# Patient Record
Sex: MALE | Race: BLACK OR AFRICAN AMERICAN | NOT HISPANIC OR LATINO | Employment: FULL TIME | URBAN - METROPOLITAN AREA
[De-identification: names, ages, dates, MRNs, and addresses within clinical notes are randomized per-mention and may not be internally consistent; named-entity substitution may affect disease eponyms.]

---

## 2019-10-18 ENCOUNTER — HISTORICAL (OUTPATIENT)
Dept: LAB | Facility: HOSPITAL | Age: 29
End: 2019-10-18

## 2019-10-19 ENCOUNTER — HISTORICAL (OUTPATIENT)
Dept: LAB | Facility: HOSPITAL | Age: 29
End: 2019-10-19

## 2019-10-19 LAB
ABS NEUT (OLG): 4.12 X10(3)/MCL (ref 2.1–9.2)
ALBUMIN SERPL-MCNC: 3.7 GM/DL (ref 3.4–5)
ALBUMIN SERPL-MCNC: 3.7 GM/DL (ref 3.4–5)
ALBUMIN/GLOB SERPL: 0.9 RATIO (ref 1.1–2)
ALP SERPL-CCNC: 62 UNIT/L (ref 45–117)
ALT SERPL-CCNC: 49 UNIT/L (ref 12–78)
AST SERPL-CCNC: 20 UNIT/L (ref 15–37)
BASOPHILS # BLD AUTO: 0 X10(3)/MCL (ref 0–0.2)
BASOPHILS NFR BLD AUTO: 0 %
BILIRUB SERPL-MCNC: 0.3 MG/DL (ref 0.2–1)
BILIRUBIN DIRECT+TOT PNL SERPL-MCNC: 0.1 MG/DL (ref 0–0.2)
BILIRUBIN DIRECT+TOT PNL SERPL-MCNC: 0.2 MG/DL
BUN SERPL-MCNC: 9 MG/DL (ref 7–18)
BUN SERPL-MCNC: 9 MG/DL (ref 7–18)
CALCIUM SERPL-MCNC: 8.5 MG/DL (ref 8.5–10.1)
CALCIUM SERPL-MCNC: 8.5 MG/DL (ref 8.5–10.1)
CHLORIDE SERPL-SCNC: 104 MMOL/L (ref 98–107)
CHLORIDE SERPL-SCNC: 104 MMOL/L (ref 98–107)
CHOLEST SERPL-MCNC: 170 MG/DL
CHOLEST/HDLC SERPL: 5.3 {RATIO} (ref 0–5)
CO2 SERPL-SCNC: 28 MMOL/L (ref 21–32)
CO2 SERPL-SCNC: 28 MMOL/L (ref 21–32)
CREAT SERPL-MCNC: 0.9 MG/DL (ref 0.6–1.3)
CREAT SERPL-MCNC: 0.9 MG/DL (ref 0.6–1.3)
CREAT UR-MCNC: 142 MG/DL
EOSINOPHIL # BLD AUTO: 0.1 X10(3)/MCL (ref 0–0.9)
EOSINOPHIL NFR BLD AUTO: 2 %
ERYTHROCYTE [DISTWIDTH] IN BLOOD BY AUTOMATED COUNT: 14.7 % (ref 11.5–14.5)
EST. AVERAGE GLUCOSE BLD GHB EST-MCNC: 321 MG/DL
GLOBULIN SER-MCNC: 4.2 GM/ML (ref 2.3–3.5)
GLUCOSE SERPL-MCNC: 302 MG/DL (ref 74–106)
GLUCOSE SERPL-MCNC: 302 MG/DL (ref 74–106)
HBA1C MFR BLD: 12.8 % (ref 4.2–6.3)
HCT VFR BLD AUTO: 38.7 % (ref 40–51)
HDLC SERPL-MCNC: 32 MG/DL (ref 40–59)
HGB BLD-MCNC: 11.9 GM/DL (ref 13.5–17.5)
IMM GRANULOCYTES # BLD AUTO: 0.03 10*3/UL
IMM GRANULOCYTES NFR BLD AUTO: 0 %
LDLC SERPL CALC-MCNC: 107 MG/DL
LYMPHOCYTES # BLD AUTO: 4.1 X10(3)/MCL (ref 0.6–4.6)
LYMPHOCYTES NFR BLD AUTO: 45 %
MCH RBC QN AUTO: 21.6 PG (ref 26–34)
MCHC RBC AUTO-ENTMCNC: 30.7 GM/DL (ref 31–37)
MCV RBC AUTO: 70.2 FL (ref 80–100)
MICROALBUMIN UR-MCNC: 10.2 MG/L (ref 0–19)
MICROALBUMIN/CREAT RATIO PNL UR: 7.2 MCG/MG CR (ref 0–29)
MONOCYTES # BLD AUTO: 0.6 X10(3)/MCL (ref 0.1–1.3)
MONOCYTES NFR BLD AUTO: 6 %
NEUTROPHILS # BLD AUTO: 4.12 X10(3)/MCL (ref 2.1–9.2)
NEUTROPHILS NFR BLD AUTO: 46 %
PHOSPHATE SERPL-MCNC: 3.1 MG/DL (ref 2.5–4.9)
PLATELET # BLD AUTO: 282 X10(3)/MCL (ref 130–400)
PMV BLD AUTO: 12.3 FL (ref 7.4–10.4)
POTASSIUM SERPL-SCNC: 3.7 MMOL/L (ref 3.5–5.1)
POTASSIUM SERPL-SCNC: 3.7 MMOL/L (ref 3.5–5.1)
PROT SERPL-MCNC: 7.9 GM/DL (ref 6.4–8.2)
RBC # BLD AUTO: 5.51 X10(6)/MCL (ref 4.5–5.9)
SODIUM SERPL-SCNC: 139 MMOL/L (ref 136–145)
SODIUM SERPL-SCNC: 139 MMOL/L (ref 136–145)
T4 FREE SERPL-MCNC: 1.13 NG/DL (ref 0.76–1.46)
TRIGL SERPL-MCNC: 155 MG/DL
TSH SERPL-ACNC: 4.13 MIU/L (ref 0.36–3.74)
VLDLC SERPL CALC-MCNC: 31 MG/DL
WBC # SPEC AUTO: 9 X10(3)/MCL (ref 4.5–11)

## 2022-04-10 ENCOUNTER — HISTORICAL (OUTPATIENT)
Dept: ADMINISTRATIVE | Facility: HOSPITAL | Age: 32
End: 2022-04-10

## 2022-04-14 LAB
LEFT EYE DM RETINOPATHY: NEGATIVE
RIGHT EYE DM RETINOPATHY: NEGATIVE

## 2022-04-24 VITALS
DIASTOLIC BLOOD PRESSURE: 92 MMHG | OXYGEN SATURATION: 100 % | BODY MASS INDEX: 33.2 KG/M2 | WEIGHT: 267 LBS | SYSTOLIC BLOOD PRESSURE: 136 MMHG | HEIGHT: 75 IN

## 2022-05-10 ENCOUNTER — PATIENT MESSAGE (OUTPATIENT)
Dept: INTERNAL MEDICINE | Facility: CLINIC | Age: 32
End: 2022-05-10
Payer: COMMERCIAL

## 2022-05-13 ENCOUNTER — OFFICE VISIT (OUTPATIENT)
Dept: INTERNAL MEDICINE | Facility: CLINIC | Age: 32
End: 2022-05-13
Payer: COMMERCIAL

## 2022-05-13 DIAGNOSIS — Z11.59 NEED FOR HEPATITIS C SCREENING TEST: ICD-10-CM

## 2022-05-13 DIAGNOSIS — R00.2 HEART PALPITATIONS: ICD-10-CM

## 2022-05-13 DIAGNOSIS — E11.9 TYPE 2 DIABETES MELLITUS WITHOUT COMPLICATION, WITH LONG-TERM CURRENT USE OF INSULIN: Primary | ICD-10-CM

## 2022-05-13 DIAGNOSIS — R00.0 TACHYCARDIA: ICD-10-CM

## 2022-05-13 DIAGNOSIS — Z79.4 TYPE 2 DIABETES MELLITUS WITHOUT COMPLICATION, WITH LONG-TERM CURRENT USE OF INSULIN: Primary | ICD-10-CM

## 2022-05-13 PROBLEM — I10 PRIMARY HYPERTENSION: Status: ACTIVE | Noted: 2022-05-13

## 2022-05-13 PROBLEM — E78.2 MIXED HYPERLIPIDEMIA: Status: ACTIVE | Noted: 2019-01-08

## 2022-05-13 PROCEDURE — 99441 PR PHYSICIAN TELEPHONE EVALUATION 5-10 MIN: CPT | Mod: 95,,, | Performed by: NURSE PRACTITIONER

## 2022-05-13 PROCEDURE — 99441 PR PHYSICIAN TELEPHONE EVALUATION 5-10 MIN: ICD-10-PCS | Mod: 95,,, | Performed by: NURSE PRACTITIONER

## 2022-05-13 RX ORDER — METFORMIN HYDROCHLORIDE 1000 MG/1
TABLET ORAL
COMMUNITY
Start: 2022-04-27 | End: 2022-08-05 | Stop reason: SDUPTHER

## 2022-05-13 RX ORDER — LISINOPRIL 20 MG/1
20 TABLET ORAL DAILY
COMMUNITY
Start: 2022-04-27 | End: 2022-11-10 | Stop reason: SDUPTHER

## 2022-05-13 RX ORDER — INSULIN LISPRO 100 [IU]/ML
INJECTION, SOLUTION INTRAVENOUS; SUBCUTANEOUS
COMMUNITY
Start: 2022-04-19 | End: 2022-05-16 | Stop reason: SDUPTHER

## 2022-05-13 RX ORDER — ROSUVASTATIN CALCIUM 20 MG/1
TABLET, COATED ORAL
COMMUNITY
Start: 2022-04-27 | End: 2022-11-10 | Stop reason: SDUPTHER

## 2022-05-13 RX ORDER — INSULIN GLARGINE 100 [IU]/ML
INJECTION, SOLUTION SUBCUTANEOUS
COMMUNITY
Start: 2022-04-20 | End: 2022-08-05 | Stop reason: SDUPTHER

## 2022-05-13 RX ORDER — SILDENAFIL CITRATE 100 MG/1
100 TABLET, FILM COATED ORAL DAILY
COMMUNITY
Start: 2022-04-27 | End: 2023-05-08 | Stop reason: SDUPTHER

## 2022-05-13 NOTE — ASSESSMENT & PLAN NOTE
Continue medications as prescribed.  Lantus 40 units at bedtime  Insulin Lispro 10 units BID with meals  Metformin 1000 mg BID   Follow ADA diet.  Avoid soda, simple sweets, and limit rice/pasta/bread/starches and consume brown options when possible.   Maintain healthy weight with BMI goal <30.   Perform aerobic exercise for 150 minutes per week (or 5 days a week for 30 minutes each day).   Examine feet daily.

## 2022-05-13 NOTE — PROGRESS NOTES
Established Patient - Audio Only Telehealth Visit     The patient location is: home    SHRADDHA Pollock   [facility]  [address] [facility] [address]      PATIENT NAME: Rodrigo Laguerre  : 1990  DATE: 22  MRN: [id number]      Billing Provider: SHRADDHA Pollock  Level of Service:   Patient PCP Information     Provider PCP Type    SHRADDHA Pollock General          Reason for Visit / Chief Complaint: Diabetes       History of Present Illness / Problem Focused Workflow     Rodrigo Laguerre presents to the clinic with Diabetes     32 yo male here today CBG review via telemed. T2DM, HTN, and HLD.    Osteoporosis Screening - 22 Vitamin D level 56.3  Diabetic Screening - 22 Fundal Exam . 22 Foot Exam  STI Screening -     22   Pt reports CBG's are in the 150's AM Fasting, reports compliance with meds, unsure if taking statin at bedtime but will start. Pt reports that he is agreeable to wellness labs today and telemed f/u next week. Denies any acute issues today. Discussed foot exam with patient and possibility of diabetic shoes, declines at this time, will use his Axix. Agreeable to Fit and Fundal exams.     22   Pt reports compliance with all meds, reports that he is aware that his diet plays a big part in his life, reports he has started meal prepping and is trying to be aware of his intake and has also started incorporating cardio into his routine. Pt reports his fasting CBG's are in the 70'80's and sometime a little lower, will decrease night time Lantus today, agreeable to 2 week telemed OV to review fasting CBG's with Lantus change. All labs reviewed and discussed, meds reviewed and refilled appropriately. Agreeable to 2 week f/u and 3 month f/u with labs both telemed.    22  Pt reports today his AM Fasting sugars have een about 142 consisently since decreasing Lantus to 40 units at bedtime. Pt denies any  concerns or issues with the change, reports compliance with Metformin increase, is aware of 3 month f/u with fasting labs. Reports today with increased heart rate, reports he has a watch that shows at rest his heart rate is in the 90's and at times can be in the 100 teens, reports sometimes he feels it racing, is agreeable to 24 our Holter monitor for eval. Denies any other concerns today.       Review of Systems     Review of Systems   Constitutional: Negative.    HENT: Negative.    Eyes: Negative.    Respiratory: Negative.    Cardiovascular: Negative.    Gastrointestinal: Negative.    Endocrine: Negative.    Genitourinary: Negative.    Neurological: Negative.    Psychiatric/Behavioral: Negative.        Medical / Social / Family History   History reviewed. No pertinent past medical history.    History reviewed. No pertinent surgical history.    Social History    reports that he has never smoked. He has never used smokeless tobacco. He reports that he does not drink alcohol and does not use drugs.    Family History  's family history includes Diabetes in his mother.    Medications and Allergies     Medications  Outpatient Medications Marked as Taking for the 5/13/22 encounter (Office Visit) with SHRADDHA Pollock   Medication Sig Dispense Refill    insulin lispro 100 unit/mL injection ADMINISTER 10 UNITS UNDER THE SKIN TWICE DAILY WITH A MEAL      LANTUS SOLOSTAR U-100 INSULIN glargine 100 units/mL (3mL) SubQ pen INJECT 50 UNITS UNDER THE SKIN ONCE DAILY AT BEDTIME      lisinopriL (PRINIVIL,ZESTRIL) 20 MG tablet Take 20 mg by mouth once daily.      metFORMIN (GLUCOPHAGE) 1000 MG tablet TAKE 1 TABLET BY MOUTH TWICE DAILY WITH MEALS FOR DIABETES      rosuvastatin (CRESTOR) 20 MG tablet TAKE 1 TABLET BY MOUTH EVERY EVENING FOR HIGH CHOLESTEROL      VIAGRA 100 mg tablet Take 100 mg by mouth once daily.         Allergies  Review of patient's allergies indicates:  No Known Allergies    Physical Examination    There were no vitals filed for this visit.  Physical Exam  HENT:      Right Ear: Hearing normal.      Left Ear: Hearing normal.   Neurological:      Mental Status: He is alert and oriented to person, place, and time.   Psychiatric:         Mood and Affect: Mood normal.           Results         Assessment and Plan (including Health Maintenance)     Plan:         Health Maintenance Due   Topic Date Due    Hepatitis C Screening  Never done    HIV Screening  Never done    COVID-19 Vaccine (2 - Booster for Keshawn series) 05/21/2021       Problem List Items Addressed This Visit        Cardiac/Vascular    Tachycardia    Current Assessment & Plan     24 Hour Cardiac Monitor Ordered           Relevant Orders    Holter monitor - 24 hour    Heart palpitations    Current Assessment & Plan     24 Hour Cardiac Monitor Ordered           Relevant Orders    Holter monitor - 24 hour       Endocrine    Type 2 diabetes mellitus without complication, with long-term current use of insulin - Primary    Current Assessment & Plan     Continue medications as prescribed.  Lantus 40 units at bedtime  Insulin Lispro 10 units BID with meals  Metformin 1000 mg BID   Follow ADA diet.  Avoid soda, simple sweets, and limit rice/pasta/bread/starches and consume brown options when possible.   Maintain healthy weight with BMI goal <30.   Perform aerobic exercise for 150 minutes per week (or 5 days a week for 30 minutes each day).   Examine feet daily.              Relevant Medications    LANTUS SOLOSTAR U-100 INSULIN glargine 100 units/mL (3mL) SubQ pen    insulin lispro 100 unit/mL injection    metFORMIN (GLUCOPHAGE) 1000 MG tablet    Other Relevant Orders    Hemoglobin    Urinalysis, Reflex to Urine Culture Urine, Clean Catch    Microalbumin/Creatinine Ratio, Urine    Basic Metabolic Panel      Other Visit Diagnoses     Need for hepatitis C screening test        Relevant Orders    Hepatitis C Antibody          Health Maintenance Topics with  due status: Not Due       Topic Last Completion Date    TETANUS VACCINE 12/22/2019       Future Appointments   Date Time Provider Department Center   7/29/2022  7:15 AM SHRADDHA Pollock [facility]             Signature:  SHRADDHA Pollock  [facility]  [address] [facility] [address]    Date of encounter: 5/13/22  Visit type: Virtual visit with audio only (telephone)       The reason for the audio only service rather than synchronous audio and video virtual visit was related to technical difficulties or patient preference/necessity.     Each patient to whom I provide medical services by telemedicine is:  (1) informed of the relationship between the physician and patient and the respective role of any other health care provider with respect to management of the patient; and (2) notified that they may decline to receive medical services by telemedicine and may withdraw from such care at any time. Patient verbally consented to receive this service via voice-only telephone call.                             This service was not originating from a related E/M service provided within the previous 7 days nor will  to an E/M service or procedure within the next 24 hours or my soonest available appointment.  Prevailing standard of care was able to be met in this audio-only visit.

## 2022-05-16 ENCOUNTER — TELEPHONE (OUTPATIENT)
Dept: INTERNAL MEDICINE | Facility: CLINIC | Age: 32
End: 2022-05-16
Payer: COMMERCIAL

## 2022-05-16 RX ORDER — INSULIN LISPRO 100 [IU]/ML
10 INJECTION, SOLUTION INTRAVENOUS; SUBCUTANEOUS 2 TIMES DAILY WITH MEALS
Qty: 1 EACH | Refills: 1 | Status: SHIPPED | OUTPATIENT
Start: 2022-05-16 | End: 2022-05-17 | Stop reason: SDUPTHER

## 2022-05-16 RX ORDER — INSULIN LISPRO 100 [IU]/ML
10 INJECTION, SOLUTION INTRAVENOUS; SUBCUTANEOUS 2 TIMES DAILY WITH MEALS
Qty: 1 EACH | Refills: 1 | Status: SHIPPED | OUTPATIENT
Start: 2022-05-16 | End: 2022-05-16

## 2022-05-16 NOTE — TELEPHONE ENCOUNTER
Informed pt, rx was sent to the pharmacy.      Last ov:4/27/22  Next ov:  7/29/22    No shows: unknown

## 2022-05-17 DIAGNOSIS — Z79.4 TYPE 2 DIABETES MELLITUS WITHOUT COMPLICATION, WITH LONG-TERM CURRENT USE OF INSULIN: Primary | ICD-10-CM

## 2022-05-17 DIAGNOSIS — E11.9 TYPE 2 DIABETES MELLITUS WITHOUT COMPLICATION, WITH LONG-TERM CURRENT USE OF INSULIN: Primary | ICD-10-CM

## 2022-05-17 RX ORDER — INSULIN LISPRO 100 [IU]/ML
10 INJECTION, SOLUTION INTRAVENOUS; SUBCUTANEOUS 2 TIMES DAILY WITH MEALS
Qty: 1 EACH | Refills: 1 | Status: SHIPPED | OUTPATIENT
Start: 2022-05-17 | End: 2022-08-05

## 2022-05-30 ENCOUNTER — PATIENT MESSAGE (OUTPATIENT)
Dept: INTERNAL MEDICINE | Facility: CLINIC | Age: 32
End: 2022-05-30
Payer: COMMERCIAL

## 2022-06-01 ENCOUNTER — PATIENT MESSAGE (OUTPATIENT)
Dept: INTERNAL MEDICINE | Facility: CLINIC | Age: 32
End: 2022-06-01
Payer: COMMERCIAL

## 2022-08-02 ENCOUNTER — PATIENT MESSAGE (OUTPATIENT)
Dept: INTERNAL MEDICINE | Facility: CLINIC | Age: 32
End: 2022-08-02
Payer: COMMERCIAL

## 2022-08-02 ENCOUNTER — LAB VISIT (OUTPATIENT)
Dept: LAB | Facility: HOSPITAL | Age: 32
End: 2022-08-02
Attending: NURSE PRACTITIONER
Payer: COMMERCIAL

## 2022-08-02 DIAGNOSIS — Z11.59 NEED FOR HEPATITIS C SCREENING TEST: ICD-10-CM

## 2022-08-02 DIAGNOSIS — E11.9 TYPE 2 DIABETES MELLITUS WITHOUT COMPLICATION, WITH LONG-TERM CURRENT USE OF INSULIN: ICD-10-CM

## 2022-08-02 DIAGNOSIS — Z79.4 TYPE 2 DIABETES MELLITUS WITHOUT COMPLICATION, WITH LONG-TERM CURRENT USE OF INSULIN: ICD-10-CM

## 2022-08-02 DIAGNOSIS — E11.9 TYPE 2 DIABETES MELLITUS WITHOUT COMPLICATION, WITH LONG-TERM CURRENT USE OF INSULIN: Primary | ICD-10-CM

## 2022-08-02 DIAGNOSIS — Z79.4 TYPE 2 DIABETES MELLITUS WITHOUT COMPLICATION, WITH LONG-TERM CURRENT USE OF INSULIN: Primary | ICD-10-CM

## 2022-08-02 LAB
ANION GAP SERPL CALC-SCNC: 9 MEQ/L
APPEARANCE UR: CLEAR
BACTERIA #/AREA URNS AUTO: ABNORMAL /HPF
BILIRUB UR QL STRIP.AUTO: NEGATIVE MG/DL
BUN SERPL-MCNC: 8.9 MG/DL (ref 8.9–20.6)
CALCIUM SERPL-MCNC: 10.1 MG/DL (ref 8.4–10.2)
CHLORIDE SERPL-SCNC: 100 MMOL/L (ref 98–107)
CO2 SERPL-SCNC: 29 MMOL/L (ref 22–29)
COLOR UR AUTO: COLORLESS
CREAT SERPL-MCNC: 0.86 MG/DL (ref 0.73–1.18)
CREAT UR-MCNC: 50.1 MG/DL (ref 63–166)
CREAT/UREA NIT SERPL: 10
EST. AVERAGE GLUCOSE BLD GHB EST-MCNC: 228.8 MG/DL
GLUCOSE SERPL-MCNC: 110 MG/DL (ref 74–100)
GLUCOSE UR QL STRIP.AUTO: NORMAL MG/DL
HBA1C MFR BLD: 9.6 %
HCV AB SERPL QL IA: NONREACTIVE
HYALINE CASTS #/AREA URNS LPF: ABNORMAL /LPF
KETONES UR QL STRIP.AUTO: NEGATIVE MG/DL
LEUKOCYTE ESTERASE UR QL STRIP.AUTO: 75 UNIT/L
MICROALBUMIN UR-MCNC: 15.9 UG/ML
MICROALBUMIN/CREAT RATIO PNL UR: 31.7 MG/GM CR (ref 0–30)
NITRITE UR QL STRIP.AUTO: NEGATIVE
PH UR STRIP.AUTO: 7 [PH]
POTASSIUM SERPL-SCNC: 3.9 MMOL/L (ref 3.5–5.1)
PROT UR QL STRIP.AUTO: NEGATIVE MG/DL
RBC #/AREA URNS AUTO: ABNORMAL /HPF
RBC UR QL AUTO: NEGATIVE UNIT/L
SODIUM SERPL-SCNC: 138 MMOL/L (ref 136–145)
SP GR UR STRIP.AUTO: 1.01
SQUAMOUS #/AREA URNS LPF: ABNORMAL /HPF
UROBILINOGEN UR STRIP-ACNC: NORMAL MG/DL
WBC #/AREA URNS AUTO: ABNORMAL /HPF

## 2022-08-02 PROCEDURE — 86803 HEPATITIS C AB TEST: CPT

## 2022-08-02 PROCEDURE — 82043 UR ALBUMIN QUANTITATIVE: CPT

## 2022-08-02 PROCEDURE — 81001 URINALYSIS AUTO W/SCOPE: CPT

## 2022-08-02 PROCEDURE — 80048 BASIC METABOLIC PNL TOTAL CA: CPT

## 2022-08-02 PROCEDURE — 36415 COLL VENOUS BLD VENIPUNCTURE: CPT

## 2022-08-02 PROCEDURE — 83036 HEMOGLOBIN GLYCOSYLATED A1C: CPT

## 2022-08-05 ENCOUNTER — OFFICE VISIT (OUTPATIENT)
Dept: INTERNAL MEDICINE | Facility: CLINIC | Age: 32
End: 2022-08-05
Payer: COMMERCIAL

## 2022-08-05 DIAGNOSIS — Z79.4 TYPE 2 DIABETES MELLITUS WITHOUT COMPLICATION, WITH LONG-TERM CURRENT USE OF INSULIN: Primary | ICD-10-CM

## 2022-08-05 DIAGNOSIS — E11.9 TYPE 2 DIABETES MELLITUS WITHOUT COMPLICATION, WITH LONG-TERM CURRENT USE OF INSULIN: Primary | ICD-10-CM

## 2022-08-05 DIAGNOSIS — E78.2 MIXED HYPERLIPIDEMIA: ICD-10-CM

## 2022-08-05 PROCEDURE — 99442 PR PHYSICIAN TELEPHONE EVALUATION 11-20 MIN: ICD-10-PCS | Mod: 95,,, | Performed by: NURSE PRACTITIONER

## 2022-08-05 PROCEDURE — 99442 PR PHYSICIAN TELEPHONE EVALUATION 11-20 MIN: CPT | Mod: 95,,, | Performed by: NURSE PRACTITIONER

## 2022-08-05 RX ORDER — INSULIN GLARGINE 100 [IU]/ML
40 INJECTION, SOLUTION SUBCUTANEOUS NIGHTLY
Qty: 3 ML | Refills: 6 | Status: SHIPPED | OUTPATIENT
Start: 2022-08-05 | End: 2022-11-10 | Stop reason: SDUPTHER

## 2022-08-05 RX ORDER — INSULIN LISPRO 100 [IU]/ML
INJECTION, SOLUTION INTRAVENOUS; SUBCUTANEOUS
COMMUNITY
Start: 2022-05-18 | End: 2022-08-05 | Stop reason: SDUPTHER

## 2022-08-05 RX ORDER — METFORMIN HYDROCHLORIDE 1000 MG/1
1000 TABLET ORAL 2 TIMES DAILY WITH MEALS
Qty: 180 TABLET | Refills: 1 | Status: SHIPPED | OUTPATIENT
Start: 2022-08-05 | End: 2023-05-08 | Stop reason: SDUPTHER

## 2022-08-05 RX ORDER — INSULIN LISPRO 100 [IU]/ML
10 INJECTION, SOLUTION INTRAVENOUS; SUBCUTANEOUS 2 TIMES DAILY WITH MEALS
Qty: 18 ML | Refills: 1 | Status: SHIPPED | OUTPATIENT
Start: 2022-08-05 | End: 2022-10-22 | Stop reason: SDUPTHER

## 2022-08-05 NOTE — PROGRESS NOTES
SHRADDHA Pollock   [facility]  [address] [address]      PATIENT NAME: Rodrigo Laugerre  : 1990  DATE: 22  MRN: [id number]      Billing Provider: SHRADDHA Pollock  Level of Service: NC OFFICE/OUTPT VISIT, EST, LEVL IV, 30-39 MIN  Patient PCP Information     Provider PCP Type    SHRADDHA Pollock General          Reason for Visit / Chief Complaint: Follow-up (Lab review)       History of Present Illness / Problem Focused Workflow     Rodrigo Laguerre presents to the clinic with Follow-up (Lab review)     32 yo male here today T2DM f/u via audio virtual. T2DM, HTN, and HLD.    Osteoporosis Screening - 22 Vitamin D level 56.3  Diabetic Screening - 22 Fundal Exam . 22 Foot Exam  STI Screening -     22  Pt reports CBG's are in the 150's AM Fasting, reports compliance with meds, unsure if taking statin at bedtime but will start. Pt reports that he is agreeable to wellness labs today and telemed f/u next week. Denies any acute issues today. Discussed foot exam with patient and possibility of diabetic shoes, declines at this time, will use his Axix. Agreeable to Fit and Fundal exams.       22  Pt reports compliance with all meds, reports that he is aware that his diet plays a big part in his life, reports he has started meal prepping and is trying to be aware of his intake and has also started incorporating cardio into his routine. Pt reports his fasting CBG's are in the 70'80's and sometime a little lower, will decrease night time Lantus today, agreeable to 2 week telemed OV to review fasting CBG's with Lantus change. All labs reviewed and discussed, meds reviewed and refilled appropriately. Agreeable to 2 week f/u and 3 month f/u with labs both telemed.    22  Pt here today for lab review, previous A1C was 11 in April; today A1C is 9.6. Reports fasting CBG's in the AM have been running between 73 -  123. He reports he is still completing cardio / weight training 5 days a week and is still meal prepping, reports compliance with meds, reports he is feeling good. Pt is agreeable to adding Januvia 25 mg daily to his regimen. We will f/u in 3 month via audio virtual with fasting labs. Denies any other issues or conerns today.   Denies chest pain, shortness of breath, cough, fever, headache, dizziness, weakness, abdominal pain, nausea, vomiting, diarrhea, constipation, black/bloody stools, unplanned weight loss, night sweats, changes in urinary patterns, burning/odor with urination, depression, anxiety, and SI/HI.           Review of Systems     Review of Systems   Constitutional: Negative.    HENT: Negative.    Eyes: Negative.    Respiratory: Negative.    Cardiovascular: Negative.    Gastrointestinal: Negative.    Endocrine: Negative.    Genitourinary: Negative.    Neurological: Negative.    Psychiatric/Behavioral: Negative.        Medical / Social / Family History     Past Medical History:   Diagnosis Date    DM (diabetes mellitus), type 2     HTN (hypertension)        History reviewed. No pertinent surgical history.    Social History    reports that he has never smoked. He has never used smokeless tobacco. He reports that he does not drink alcohol and does not use drugs.    Family History  's family history includes Diabetes in his mother.    Medications and Allergies     Medications  Medication List with Changes/Refills   New Medications    SITAGLIPTIN (JANUVIA) 25 MG TAB    Take 1 tablet (25 mg total) by mouth once daily. For Diabetes.   Current Medications    LISINOPRIL (PRINIVIL,ZESTRIL) 20 MG TABLET    Take 20 mg by mouth once daily.    ROSUVASTATIN (CRESTOR) 20 MG TABLET    TAKE 1 TABLET BY MOUTH EVERY EVENING FOR HIGH CHOLESTEROL    VIAGRA 100 MG TABLET    Take 100 mg by mouth once daily.   Changed and/or Refilled Medications    Modified Medication Previous Medication    HUMALOG KWIKPEN INSULIN 100  UNIT/ML PEN HUMALOG KWIKPEN INSULIN 100 unit/mL pen       Inject 10 Units into the skin 2 (two) times daily with meals.    SMARTSIG:10 Unit(s) SUB-Q Twice Daily    LANTUS SOLOSTAR U-100 INSULIN GLARGINE 100 UNITS/ML SUBQ PEN LANTUS SOLOSTAR U-100 INSULIN glargine 100 units/mL (3mL) SubQ pen       Inject 40 Units into the skin every evening. For Diabetes.    INJECT 50 UNITS UNDER THE SKIN ONCE DAILY AT BEDTIME    METFORMIN (GLUCOPHAGE) 1000 MG TABLET metFORMIN (GLUCOPHAGE) 1000 MG tablet       Take 1 tablet (1,000 mg total) by mouth 2 (two) times daily with meals. For Diabetes    TAKE 1 TABLET BY MOUTH TWICE DAILY WITH MEALS FOR DIABETES   Discontinued Medications    INSULIN LISPRO 100 UNIT/ML CRTG    Inject 0.1 mLs (10 Units total) into the skin 2 (two) times a day.    INSULIN LISPRO 100 UNIT/ML INJECTION    Inject 10 Units into the skin 2 (two) times daily with meals.         Allergies  Review of patient's allergies indicates:  No Known Allergies    Physical Examination   There were no vitals filed for this visit.  Physical Exam  HENT:      Right Ear: Hearing normal.      Left Ear: Hearing normal.   Neurological:      Mental Status: He is alert and oriented to person, place, and time.   Psychiatric:         Mood and Affect: Mood normal.           Results     Lab Results   Component Value Date    WBC 9.0 10/19/2019    RBC 5.51 10/19/2019    HGB 11.9 (L) 10/19/2019    HCT 38.7 (L) 10/19/2019    MCV 70.2 (L) 10/19/2019    MCH 21.6 (L) 10/19/2019    MCHC 30.7 (L) 10/19/2019    RDW 14.7 (H) 10/19/2019     10/19/2019    MPV 12.3 (H) 10/19/2019     Sodium Level   Date Value Ref Range Status   08/02/2022 138 136 - 145 mmol/L Final     Potassium Level   Date Value Ref Range Status   08/02/2022 3.9 3.5 - 5.1 mmol/L Final     Carbon Dioxide   Date Value Ref Range Status   08/02/2022 29 22 - 29 mmol/L Final     Blood Urea Nitrogen   Date Value Ref Range Status   08/02/2022 8.9 8.9 - 20.6 mg/dL Final     Creatinine    Date Value Ref Range Status   08/02/2022 0.86 0.73 - 1.18 mg/dL Final     Calcium Level Total   Date Value Ref Range Status   08/02/2022 10.1 8.4 - 10.2 mg/dL Final     Albumin Level   Date Value Ref Range Status   10/19/2019 3.7 3.4 - 5.0 gm/dL Final   10/19/2019 3.7 3.4 - 5.0 gm/dL Final     Bilirubin Total   Date Value Ref Range Status   10/19/2019 0.3 0.2 - 1.0 mg/dL Final     Alkaline Phosphatase   Date Value Ref Range Status   10/19/2019 62 45 - 117 unit/L Final     Aspartate Aminotransferase   Date Value Ref Range Status   10/19/2019 20 15 - 37 unit/L Final     Alanine Aminotransferase   Date Value Ref Range Status   10/19/2019 49 12 - 78 unit/L Final     Estimated GFR-Non    Date Value Ref Range Status   10/19/2019 >105 >>=90 mL/min Final     Lab Results   Component Value Date    CHOL 170 10/19/2019     Lab Results   Component Value Date    HDL 32 (L) 10/19/2019     No results found for: LDLCALC  Lab Results   Component Value Date    TRIG 155 (H) 10/19/2019     No results found for: CHOLHDL  Lab Results   Component Value Date    TSH 4.130 (H) 10/19/2019     Lab Results   Component Value Date    PROTEINUA Negative 08/02/2022    LEUKOCYTESUR 75  08/02/2022     Lab Results   Component Value Date    HGBA1C 9.6 (H) 08/02/2022    HGBA1C 12.8 (H) 10/19/2019     No results found for: MICROALBUR, [id number]   No results found for this or any previous visit.         Assessment and Plan (including Health Maintenance)     Plan:         Health Maintenance Due   Topic Date Due    HIV Screening  Never done    COVID-19 Vaccine (2 - Booster for Keshawn series) 05/21/2021       Problem List Items Addressed This Visit        Cardiac/Vascular    Mixed hyperlipidemia       Endocrine    Type 2 diabetes mellitus without complication, with long-term current use of insulin - Primary (Chronic)    Current Assessment & Plan     RX Metformin 1000 mg BID, Lantus 40 units qhs, Humalog 10 units BID  START Januvia 25 mg  daily  CBG Logs  Continue diet and exercise regimen   3 month f/u with fasting labs audio   Follow ADA diet.  Avoid soda, simple sweets, and limit rice/pasta/bread/starches and consume brown options when possible.   Maintain healthy weight with BMI goal <30.   Perform aerobic exercise for 150 minutes per week (or 5 days a week for 30 minutes each day).   Examine feet daily.              Relevant Medications    SITagliptin (JANUVIA) 25 MG Tab    LANTUS SOLOSTAR U-100 INSULIN glargine 100 units/mL SubQ pen    metFORMIN (GLUCOPHAGE) 1000 MG tablet    HUMALOG KWIKPEN INSULIN 100 unit/mL pen    Other Relevant Orders    CBC Auto Differential    Comprehensive Metabolic Panel    Lipid Panel    Urinalysis, Reflex to Urine Culture Urine, Clean Catch    Microalbumin/Creatinine Ratio, Urine    Hemoglobin A1C          Health Maintenance Topics with due status: Not Due       Topic Last Completion Date    TETANUS VACCINE 12/22/2019    Influenza Vaccine 10/14/2021       Future Appointments   Date Time Provider Department Center   8/29/2022  9:30 AM CV [facility] CARDIAC MONITOR [facility] ADDISON [facility]             Signature:     [facility]  [address] [facility] [address]    Date of encounter: 8/5/22      Established Patient - Audio Only Telehealth Visit     The patient location is: home  The chief complaint leading to consultation is: T2DM  Visit type: Virtual visit with audio only (telephone)  Total time spent with patient: 13 mins       The reason for the audio only service rather than synchronous audio and video virtual visit was related to technical difficulties or patient preference/necessity.     Each patient to whom I provide medical services by telemedicine is:  (1) informed of the relationship between the physician and patient and the respective role of any other health care provider with respect to management of the patient; and (2) notified that they may  decline to receive medical services by telemedicine and may withdraw from such care at any time. Patient verbally consented to receive this service via voice-only telephone call.           This service was not originating from a related E/M service provided within the previous 7 days nor will  to an E/M service or procedure within the next 24 hours or my soonest available appointment.  Prevailing standard of care was able to be met in this audio-only visit.

## 2022-08-05 NOTE — ASSESSMENT & PLAN NOTE
RX Metformin 1000 mg BID, Lantus 40 units qhs, Humalog 10 units BID  START Januvia 25 mg daily  CBG Logs  Continue diet and exercise regimen   3 month f/u with fasting labs audio   Follow ADA diet.  Avoid soda, simple sweets, and limit rice/pasta/bread/starches and consume brown options when possible.   Maintain healthy weight with BMI goal <30.   Perform aerobic exercise for 150 minutes per week (or 5 days a week for 30 minutes each day).   Examine feet daily.

## 2022-09-23 ENCOUNTER — DOCUMENTATION ONLY (OUTPATIENT)
Dept: ADMINISTRATIVE | Facility: HOSPITAL | Age: 32
End: 2022-09-23
Payer: COMMERCIAL

## 2022-11-08 ENCOUNTER — LAB VISIT (OUTPATIENT)
Dept: LAB | Facility: HOSPITAL | Age: 32
End: 2022-11-08
Attending: NURSE PRACTITIONER
Payer: COMMERCIAL

## 2022-11-08 DIAGNOSIS — Z79.4 TYPE 2 DIABETES MELLITUS WITHOUT COMPLICATION, WITH LONG-TERM CURRENT USE OF INSULIN: ICD-10-CM

## 2022-11-08 DIAGNOSIS — E11.9 TYPE 2 DIABETES MELLITUS WITHOUT COMPLICATION, WITH LONG-TERM CURRENT USE OF INSULIN: ICD-10-CM

## 2022-11-08 LAB
ALBUMIN SERPL-MCNC: 4.3 GM/DL (ref 3.5–5)
ALBUMIN/GLOB SERPL: 1.1 RATIO (ref 1.1–2)
ALP SERPL-CCNC: 45 UNIT/L (ref 40–150)
ALT SERPL-CCNC: 36 UNIT/L (ref 0–55)
APPEARANCE UR: CLEAR
AST SERPL-CCNC: 26 UNIT/L (ref 5–34)
BACTERIA #/AREA URNS AUTO: ABNORMAL /HPF
BASOPHILS # BLD AUTO: 0.04 X10(3)/MCL (ref 0–0.2)
BASOPHILS NFR BLD AUTO: 0.5 %
BILIRUB UR QL STRIP.AUTO: NEGATIVE MG/DL
BILIRUBIN DIRECT+TOT PNL SERPL-MCNC: 0.4 MG/DL
BUN SERPL-MCNC: 12.5 MG/DL (ref 8.9–20.6)
CALCIUM SERPL-MCNC: 10.2 MG/DL (ref 8.4–10.2)
CHLORIDE SERPL-SCNC: 102 MMOL/L (ref 98–107)
CHOLEST SERPL-MCNC: 142 MG/DL
CHOLEST/HDLC SERPL: 4 {RATIO} (ref 0–5)
CO2 SERPL-SCNC: 27 MMOL/L (ref 22–29)
COLOR UR AUTO: ABNORMAL
CREAT SERPL-MCNC: 1.11 MG/DL (ref 0.73–1.18)
CREAT UR-MCNC: 251.6 MG/DL (ref 63–166)
EOSINOPHIL # BLD AUTO: 0.1 X10(3)/MCL (ref 0–0.9)
EOSINOPHIL NFR BLD AUTO: 1.2 %
ERYTHROCYTE [DISTWIDTH] IN BLOOD BY AUTOMATED COUNT: 15.2 % (ref 11.5–17)
EST. AVERAGE GLUCOSE BLD GHB EST-MCNC: 208.7 MG/DL
GFR SERPLBLD CREATININE-BSD FMLA CKD-EPI: >60 MLS/MIN/1.73/M2
GLOBULIN SER-MCNC: 3.9 GM/DL (ref 2.4–3.5)
GLUCOSE SERPL-MCNC: 181 MG/DL (ref 74–100)
GLUCOSE UR QL STRIP.AUTO: ABNORMAL MG/DL
HBA1C MFR BLD: 8.9 %
HCT VFR BLD AUTO: 37.9 % (ref 42–52)
HDLC SERPL-MCNC: 32 MG/DL (ref 35–60)
HGB BLD-MCNC: 11.7 GM/DL (ref 14–18)
HYALINE CASTS #/AREA URNS LPF: ABNORMAL /LPF
IMM GRANULOCYTES # BLD AUTO: 0.02 X10(3)/MCL (ref 0–0.04)
IMM GRANULOCYTES NFR BLD AUTO: 0.2 %
KETONES UR QL STRIP.AUTO: NEGATIVE MG/DL
LDLC SERPL CALC-MCNC: 87 MG/DL (ref 50–140)
LEUKOCYTE ESTERASE UR QL STRIP.AUTO: 25 UNIT/L
LYMPHOCYTES # BLD AUTO: 3.64 X10(3)/MCL (ref 0.6–4.6)
LYMPHOCYTES NFR BLD AUTO: 44.4 %
MCH RBC QN AUTO: 21.4 PG (ref 27–31)
MCHC RBC AUTO-ENTMCNC: 30.9 MG/DL (ref 33–36)
MCV RBC AUTO: 69.2 FL (ref 80–94)
MICROALBUMIN UR-MCNC: 60.2 UG/ML
MICROALBUMIN/CREAT RATIO PNL UR: 23.9 MG/GM CR (ref 0–30)
MONOCYTES # BLD AUTO: 0.58 X10(3)/MCL (ref 0.1–1.3)
MONOCYTES NFR BLD AUTO: 7.1 %
MUCOUS THREADS URNS QL MICRO: ABNORMAL /LPF
NEUTROPHILS # BLD AUTO: 3.8 X10(3)/MCL (ref 2.1–9.2)
NEUTROPHILS NFR BLD AUTO: 46.6 %
NITRITE UR QL STRIP.AUTO: NEGATIVE
NRBC BLD AUTO-RTO: 0 %
PH UR STRIP.AUTO: 5.5 [PH]
PLATELET # BLD AUTO: 283 X10(3)/MCL (ref 130–400)
PMV BLD AUTO: 12.1 FL (ref 7.4–10.4)
POTASSIUM SERPL-SCNC: 4.1 MMOL/L (ref 3.5–5.1)
PROT SERPL-MCNC: 8.2 GM/DL (ref 6.4–8.3)
PROT UR QL STRIP.AUTO: ABNORMAL MG/DL
RBC # BLD AUTO: 5.48 X10(6)/MCL (ref 4.7–6.1)
RBC #/AREA URNS AUTO: ABNORMAL /HPF
RBC UR QL AUTO: NEGATIVE UNIT/L
SODIUM SERPL-SCNC: 139 MMOL/L (ref 136–145)
SP GR UR STRIP.AUTO: 1.03
SQUAMOUS #/AREA URNS LPF: ABNORMAL /HPF
TRIGL SERPL-MCNC: 114 MG/DL (ref 34–140)
UROBILINOGEN UR STRIP-ACNC: NORMAL MG/DL
VLDLC SERPL CALC-MCNC: 23 MG/DL
WBC # SPEC AUTO: 8.2 X10(3)/MCL (ref 4.5–11.5)
WBC #/AREA URNS AUTO: ABNORMAL /HPF

## 2022-11-08 PROCEDURE — 87184 SC STD DISK METHOD PER PLATE: CPT

## 2022-11-08 PROCEDURE — 36415 COLL VENOUS BLD VENIPUNCTURE: CPT

## 2022-11-08 PROCEDURE — 82043 UR ALBUMIN QUANTITATIVE: CPT

## 2022-11-08 PROCEDURE — 83036 HEMOGLOBIN GLYCOSYLATED A1C: CPT

## 2022-11-08 PROCEDURE — 81001 URINALYSIS AUTO W/SCOPE: CPT

## 2022-11-08 PROCEDURE — 80053 COMPREHEN METABOLIC PANEL: CPT

## 2022-11-08 PROCEDURE — 80061 LIPID PANEL: CPT

## 2022-11-08 PROCEDURE — 85025 COMPLETE CBC W/AUTO DIFF WBC: CPT

## 2022-11-10 ENCOUNTER — OFFICE VISIT (OUTPATIENT)
Dept: INTERNAL MEDICINE | Facility: CLINIC | Age: 32
End: 2022-11-10
Payer: COMMERCIAL

## 2022-11-10 DIAGNOSIS — E11.9 TYPE 2 DIABETES MELLITUS WITHOUT COMPLICATION, WITH LONG-TERM CURRENT USE OF INSULIN: Primary | Chronic | ICD-10-CM

## 2022-11-10 DIAGNOSIS — Z79.4 TYPE 2 DIABETES MELLITUS WITHOUT COMPLICATION, WITH LONG-TERM CURRENT USE OF INSULIN: Primary | Chronic | ICD-10-CM

## 2022-11-10 DIAGNOSIS — I10 PRIMARY HYPERTENSION: ICD-10-CM

## 2022-11-10 DIAGNOSIS — E78.2 MIXED HYPERLIPIDEMIA: ICD-10-CM

## 2022-11-10 PROCEDURE — 99441 PR PHYSICIAN TELEPHONE EVALUATION 5-10 MIN: ICD-10-PCS | Mod: 95,,, | Performed by: NURSE PRACTITIONER

## 2022-11-10 PROCEDURE — 99441 PR PHYSICIAN TELEPHONE EVALUATION 5-10 MIN: CPT | Mod: 95,,, | Performed by: NURSE PRACTITIONER

## 2022-11-10 RX ORDER — LISINOPRIL 20 MG/1
20 TABLET ORAL DAILY
Qty: 90 TABLET | Refills: 1 | Status: SHIPPED | OUTPATIENT
Start: 2022-11-10 | End: 2023-05-08

## 2022-11-10 RX ORDER — INSULIN LISPRO 100 [IU]/ML
10 INJECTION, SOLUTION INTRAVENOUS; SUBCUTANEOUS 2 TIMES DAILY WITH MEALS
Qty: 18 ML | Refills: 2 | Status: SHIPPED | OUTPATIENT
Start: 2022-11-10 | End: 2023-05-08 | Stop reason: SDUPTHER

## 2022-11-10 RX ORDER — INSULIN GLARGINE 100 [IU]/ML
40 INJECTION, SOLUTION SUBCUTANEOUS NIGHTLY
Qty: 3 ML | Refills: 6 | Status: SHIPPED | OUTPATIENT
Start: 2022-11-10 | End: 2023-05-08 | Stop reason: SDUPTHER

## 2022-11-10 RX ORDER — ROSUVASTATIN CALCIUM 20 MG/1
20 TABLET, COATED ORAL NIGHTLY
Qty: 90 TABLET | Refills: 1 | Status: SHIPPED | OUTPATIENT
Start: 2022-11-10 | End: 2023-05-08 | Stop reason: SDUPTHER

## 2022-11-10 NOTE — PROGRESS NOTES
SHRADDHA Pollock   [facility]  [address] [facility] [address]      PATIENT NAME: Rodrigo Laguerre  : 1990  DATE: 11/10/22  MRN: [id number]      Reason for Visit / Chief Complaint: Follow-up (Lab review )       History of Present Illness / Problem Focused Workflow     Rodrigo Laguerre presents to the clinic with Follow-up (Lab review )     30 yo male here today T2DM f/u via audio virtual. T2DM, HTN, and HLD.    Osteoporosis Screening - 22 Vitamin D level 56.3  Diabetic Screening - 22 Fundal Exam . 22 Foot Exam  STI Screening -     22  Pt here today for lab review, previous A1C was 11 in April; today A1C is 9.6. Reports fasting CBG's in the AM have been running between 73 - 123. He reports he is still completing cardio / weight training 5 days a week and is still meal prepping, reports compliance with meds, reports he is feeling good. Pt is agreeable to adding Januvia 25 mg daily to his regimen. We will f/u in 3 month via audio virtual with fasting labs. Denies any other issues or conerns today.     11/10/22  Pt here today for routine audio f/u for T2DM, LOV we started Januvia 25 mg daily, pt A1C was 9.6 at that time, today is 8.9. The pt reports compliance with meds, he is still meal prepping and completing cardio / weight training. He is agreeable to increasing Januvia to 50 mg daily; will f/u in 3 months with labs to evaluate via audio. Denies any other concerns today. Meds reviewed and refilled appropriately.   Denies chest pain, shortness of breath, cough, fever, headache, dizziness, weakness, abdominal pain, nausea, vomiting, diarrhea, constipation, black/bloody stools, unplanned weight loss, night sweats, changes in urinary patterns, burning/odor with urination, depression, anxiety, and SI/HI.       Follow-up      Review of Systems     Review of Systems   Constitutional: Negative.    HENT: Negative.     Eyes:  Negative.    Respiratory: Negative.     Cardiovascular: Negative.    Gastrointestinal: Negative.    Endocrine: Negative.    Genitourinary: Negative.    Neurological: Negative.    Psychiatric/Behavioral: Negative.         Medications and Allergies     Medications  Medication List with Changes/Refills   Current Medications    HUMALOG KWIKPEN INSULIN 100 UNIT/ML PEN    Inject 10 Units into the skin 2 (two) times daily with meals.    LANTUS SOLOSTAR U-100 INSULIN GLARGINE 100 UNITS/ML SUBQ PEN    Inject 40 Units into the skin every evening. For Diabetes.    LISINOPRIL (PRINIVIL,ZESTRIL) 20 MG TABLET    Take 20 mg by mouth once daily.    METFORMIN (GLUCOPHAGE) 1000 MG TABLET    Take 1 tablet (1,000 mg total) by mouth 2 (two) times daily with meals. For Diabetes    ROSUVASTATIN (CRESTOR) 20 MG TABLET    TAKE 1 TABLET BY MOUTH EVERY EVENING FOR HIGH CHOLESTEROL    SITAGLIPTIN (JANUVIA) 25 MG TAB    Take 1 tablet (25 mg total) by mouth once daily. For Diabetes.    VIAGRA 100 MG TABLET    Take 100 mg by mouth once daily.         Allergies  Review of patient's allergies indicates:  No Known Allergies    Physical Examination   There were no vitals filed for this visit.  Physical Exam  HENT:      Right Ear: Hearing normal.      Left Ear: Hearing normal.   Neurological:      Mental Status: He is alert and oriented to person, place, and time.   Psychiatric:         Mood and Affect: Mood normal.         Results     Lab Results   Component Value Date    WBC 8.2 11/08/2022    RBC 5.48 11/08/2022    HGB 11.7 (L) 11/08/2022    HCT 37.9 (L) 11/08/2022    MCV 69.2 (L) 11/08/2022    MCH 21.4 (L) 11/08/2022    MCHC 30.9 (L) 11/08/2022    RDW 15.2 11/08/2022     11/08/2022    MPV 12.1 (H) 11/08/2022     Sodium Level   Date Value Ref Range Status   11/08/2022 139 136 - 145 mmol/L Final     Potassium Level   Date Value Ref Range Status   11/08/2022 4.1 3.5 - 5.1 mmol/L Final     Carbon Dioxide   Date Value Ref Range Status    11/08/2022 27 22 - 29 mmol/L Final     Blood Urea Nitrogen   Date Value Ref Range Status   11/08/2022 12.5 8.9 - 20.6 mg/dL Final     Creatinine   Date Value Ref Range Status   11/08/2022 1.11 0.73 - 1.18 mg/dL Final     Calcium Level Total   Date Value Ref Range Status   11/08/2022 10.2 8.4 - 10.2 mg/dL Final     Albumin Level   Date Value Ref Range Status   11/08/2022 4.3 3.5 - 5.0 gm/dL Final     Bilirubin Total   Date Value Ref Range Status   11/08/2022 0.4 <=1.5 mg/dL Final     Alkaline Phosphatase   Date Value Ref Range Status   11/08/2022 45 40 - 150 unit/L Final     Aspartate Aminotransferase   Date Value Ref Range Status   11/08/2022 26 5 - 34 unit/L Final     Alanine Aminotransferase   Date Value Ref Range Status   11/08/2022 36 0 - 55 unit/L Final     Estimated GFR-Non    Date Value Ref Range Status   10/19/2019 >105 >>=90 mL/min Final     Lab Results   Component Value Date    CHOL 142 11/08/2022     Lab Results   Component Value Date    HDL 32 (L) 11/08/2022     No results found for: LDLCALC  Lab Results   Component Value Date    TRIG 114 11/08/2022     No results found for: CHOLHDL  Lab Results   Component Value Date    TSH 4.130 (H) 10/19/2019     Lab Results   Component Value Date    PROTEINUA Trace (A) 11/08/2022    LEUKOCYTESUR 25 (A) 11/08/2022     Lab Results   Component Value Date    HGBA1C 8.9 (H) 11/08/2022    HGBA1C 9.6 (H) 08/02/2022    HGBA1C 12.8 (H) 10/19/2019     No results found for: MICROALBUR, [id number]   No results found for this or any previous visit.         Assessment and Plan      Plan:       Problem List Items Addressed This Visit    None        No future appointments.         Signature:     [facility]  [address] [facility] [address]    Date of encounter: 11/10/22      Established Patient - Audio Only Telehealth Visit     The patient location is: home  The chief complaint leading to  consultation is: T2DM  Visit type: Virtual visit with audio only (telephone)  Total time spent with patient: 9 mins       The reason for the audio only service rather than synchronous audio and video virtual visit was related to technical difficulties or patient preference/necessity.     Each patient to whom I provide medical services by telemedicine is:  (1) informed of the relationship between the physician and patient and the respective role of any other health care provider with respect to management of the patient; and (2) notified that they may decline to receive medical services by telemedicine and may withdraw from such care at any time. Patient verbally consented to receive this service via voice-only telephone call       This service was not originating from a related E/M service provided within the previous 7 days nor will  to an E/M service or procedure within the next 24 hours or my soonest available appointment.  Prevailing standard of care was able to be met in this audio-only visit.

## 2022-11-10 NOTE — ASSESSMENT & PLAN NOTE
Rx lisinopril 50 mg daily   Low Sodium Diet (Dash Diet - less than 2 grams of sodium per day).  Maintain healthy weight with goal BMI <30. Exercise 30 minutes per day 5 days per week.

## 2022-11-10 NOTE — ASSESSMENT & PLAN NOTE
RX rosuvastatin 20 mg q hs  Follow a low cholesterol, low saturated fat diet with less than 200 mg of cholesterol a day.   Avoid fried foods and high saturated fats (nicole, sausage, cookies, cakes, chips, cheese, whole milk, butter, mayonnaise, creamy dressings, gravy, stew, gumbo, boudin, cracklins and cream sauces).  Add flax seed or fish oil supplements to diet.   Increase dietary fiber.   Regular exercise improves cholesterol levels.  Physical activity 5 times a week for 30 minutes per day (or 150 minutes per week).   Stressed importance of dietary modifications.

## 2022-11-10 NOTE — ASSESSMENT & PLAN NOTE
RX Metformin 1000 mg BID  RX Increase januvia to 50 mg daily  RX lantus 40 units q hs  RX humalog 10 units BIDAC  3 month f/u F2F with labs   Follow ADA diet.  Avoid soda, simple sweets, and limit rice/pasta/bread/starches and consume brown options when possible.   Maintain healthy weight with BMI goal <30.   Perform aerobic exercise for 150 minutes per week (or 5 days a week for 30 minutes each day).   Examine feet daily.

## 2022-11-12 LAB — BACTERIA UR CULT: ABNORMAL

## 2022-12-07 ENCOUNTER — CLINICAL SUPPORT (OUTPATIENT)
Dept: URGENT CARE | Facility: CLINIC | Age: 32
End: 2022-12-07
Payer: COMMERCIAL

## 2022-12-07 DIAGNOSIS — Z23 ENCOUNTER FOR VACCINATION: Primary | ICD-10-CM

## 2022-12-07 PROCEDURE — 99211 OFF/OP EST MAY X REQ PHY/QHP: CPT | Mod: PBBFAC,25

## 2022-12-07 PROCEDURE — 90471 IMMUNIZATION ADMIN: CPT | Mod: PBBFAC

## 2022-12-07 NOTE — PROGRESS NOTES
Subjective:       Patient ID: Rodrigo Laguerre is a 32 y.o. male.    Vitals:  vitals were not taken for this visit.     Chief Complaint: Flu Vaccine    HPI  ROS    Objective:      Physical Exam      Assessment:       1. Encounter for vaccination            Plan:         Encounter for vaccination  -     Influenza - Quadrivalent (PF); Standing

## 2023-01-05 ENCOUNTER — PATIENT MESSAGE (OUTPATIENT)
Dept: INTERNAL MEDICINE | Facility: CLINIC | Age: 33
End: 2023-01-05
Payer: COMMERCIAL

## 2023-01-05 DIAGNOSIS — R53.83 FATIGUE, UNSPECIFIED TYPE: Primary | ICD-10-CM

## 2023-01-16 ENCOUNTER — PATIENT MESSAGE (OUTPATIENT)
Dept: INTERNAL MEDICINE | Facility: CLINIC | Age: 33
End: 2023-01-16
Payer: COMMERCIAL

## 2023-01-18 ENCOUNTER — OFFICE VISIT (OUTPATIENT)
Dept: URGENT CARE | Facility: CLINIC | Age: 33
End: 2023-01-18
Payer: COMMERCIAL

## 2023-01-18 VITALS
RESPIRATION RATE: 18 BRPM | WEIGHT: 270 LBS | HEART RATE: 82 BPM | TEMPERATURE: 98 F | BODY MASS INDEX: 34.65 KG/M2 | HEIGHT: 74 IN | SYSTOLIC BLOOD PRESSURE: 121 MMHG | OXYGEN SATURATION: 99 % | DIASTOLIC BLOOD PRESSURE: 77 MMHG

## 2023-01-18 DIAGNOSIS — M25.511 ACUTE PAIN OF RIGHT SHOULDER: Primary | ICD-10-CM

## 2023-01-18 PROCEDURE — 99213 PR OFFICE/OUTPT VISIT, EST, LEVL III, 20-29 MIN: ICD-10-PCS | Mod: S$PBB,,,

## 2023-01-18 PROCEDURE — 99213 OFFICE O/P EST LOW 20 MIN: CPT | Mod: S$PBB,,,

## 2023-01-18 PROCEDURE — 99214 OFFICE O/P EST MOD 30 MIN: CPT | Mod: PBBFAC

## 2023-01-18 RX ORDER — KETOROLAC TROMETHAMINE 30 MG/ML
30 INJECTION, SOLUTION INTRAMUSCULAR; INTRAVENOUS
Status: COMPLETED | OUTPATIENT
Start: 2023-01-18 | End: 2023-01-18

## 2023-01-18 RX ORDER — NAPROXEN 500 MG/1
500 TABLET ORAL 2 TIMES DAILY WITH MEALS
Qty: 28 TABLET | Refills: 0 | Status: SHIPPED | OUTPATIENT
Start: 2023-01-18 | End: 2023-02-01

## 2023-01-18 RX ADMIN — KETOROLAC TROMETHAMINE 30 MG: 30 INJECTION, SOLUTION INTRAMUSCULAR; INTRAVENOUS at 02:01

## 2023-01-26 ENCOUNTER — OFFICE VISIT (OUTPATIENT)
Dept: URGENT CARE | Facility: CLINIC | Age: 33
End: 2023-01-26
Payer: COMMERCIAL

## 2023-01-26 ENCOUNTER — TELEPHONE (OUTPATIENT)
Dept: INTERNAL MEDICINE | Facility: CLINIC | Age: 33
End: 2023-01-26
Payer: COMMERCIAL

## 2023-01-26 VITALS
OXYGEN SATURATION: 97 % | DIASTOLIC BLOOD PRESSURE: 90 MMHG | BODY MASS INDEX: 33.62 KG/M2 | WEIGHT: 262 LBS | TEMPERATURE: 98 F | RESPIRATION RATE: 18 BRPM | SYSTOLIC BLOOD PRESSURE: 151 MMHG | HEIGHT: 74 IN | HEART RATE: 80 BPM

## 2023-01-26 DIAGNOSIS — E11.9 TYPE 2 DIABETES MELLITUS WITHOUT COMPLICATION, WITH LONG-TERM CURRENT USE OF INSULIN: ICD-10-CM

## 2023-01-26 DIAGNOSIS — Z79.4 TYPE 2 DIABETES MELLITUS WITHOUT COMPLICATION, WITH LONG-TERM CURRENT USE OF INSULIN: ICD-10-CM

## 2023-01-26 DIAGNOSIS — S90.822A BLISTER OF LEFT FOOT, INITIAL ENCOUNTER: Primary | ICD-10-CM

## 2023-01-26 PROCEDURE — 99213 PR OFFICE/OUTPT VISIT, EST, LEVL III, 20-29 MIN: ICD-10-PCS | Mod: S$PBB,,, | Performed by: FAMILY MEDICINE

## 2023-01-26 PROCEDURE — 99213 OFFICE O/P EST LOW 20 MIN: CPT | Mod: S$PBB,,, | Performed by: FAMILY MEDICINE

## 2023-01-26 RX ORDER — LEVOFLOXACIN 500 MG/1
500 TABLET, FILM COATED ORAL DAILY
Qty: 7 TABLET | Refills: 0 | Status: SHIPPED | OUTPATIENT
Start: 2023-01-26 | End: 2023-02-02

## 2023-01-26 RX ORDER — MUPIROCIN 20 MG/G
OINTMENT TOPICAL 3 TIMES DAILY
Qty: 22 G | Refills: 1 | Status: SHIPPED | OUTPATIENT
Start: 2023-01-26 | End: 2023-02-02

## 2023-01-26 NOTE — TELEPHONE ENCOUNTER
Patient called stating he had a blister on his foot, and it started to peel. Wants you to have a look at it and possibly schedule an appt if there's anything soon. Please advise.

## 2023-01-26 NOTE — PATIENT INSTRUCTIONS
Apply mupirocin to the wound twice daily    Apply generous Vaseline on the wound once or twice daily    Take Levaquin once daily for 7 days    Use a hydrocolloid dressing over the wound while it heals to prevent further friction from impairing healing.    Someone will call you with an appointment with endocrinology.    Please follow-up with your primary care provider in the next couple weeks.

## 2023-01-26 NOTE — PROGRESS NOTES
"Subjective:       Patient ID: Rodrigo Laguerre is a 32 y.o. male.    Vitals:  height is 6' 2" (1.88 m) and weight is 118.8 kg (262 lb). His temperature is 98.1 °F (36.7 °C). His blood pressure is 151/90 (abnormal) and his pulse is 80. His respiration is 18 and oxygen saturation is 97%.     Chief Complaint: Blister (Blister on left foot suspects from work boots x 1 week. Patient is a type 2 diabetic )    HPI  ROS    Objective:      Physical Exam      Assessment:       No diagnosis found.      Plan:         There are no diagnoses linked to this encounter.                 "

## 2023-01-26 NOTE — PROGRESS NOTES
Patient ID: [id number]     Chief Complaint: upper respiratory tract infection symptoms    History of Present Illness:     Rodrigo Laguerre is a 32 y.o. male  who presents today for symptoms of Blister (Blister on left foot suspects from work boots x 1 week. Patient is a type 2 diabetic )    32-year-old male with a history of type 2 diabetes presents today with an unroofed friction blister under his left big toe.  He is concerned about infection because he is diabetic, and he wanted some advice.      Pt denies experiencing any fevers, chills, nausea, vomiting, difficulty breathing, dysphagia, or neck stiffness.    Past Medical History:     ----------------------------  DM (diabetes mellitus), type 2  HTN (hypertension)     History reviewed. No pertinent surgical history.    Review of patient's allergies indicates:  No Known Allergies    Outpatient Medications Marked as Taking for the 1/26/23 encounter (Office Visit) with Neel Graves MD   Medication Sig Dispense Refill    HUMALOG KWIKPEN INSULIN 100 unit/mL pen Inject 10 Units into the skin 2 (two) times daily with meals. 18 mL 2    LANTUS SOLOSTAR U-100 INSULIN glargine 100 units/mL SubQ pen Inject 40 Units into the skin every evening. For Diabetes. 3 mL 6    metFORMIN (GLUCOPHAGE) 1000 MG tablet Take 1 tablet (1,000 mg total) by mouth 2 (two) times daily with meals. For Diabetes 180 tablet 1    naproxen (NAPROSYN) 500 MG tablet Take 1 tablet (500 mg total) by mouth 2 (two) times daily with meals. for 14 days 28 tablet 0    rosuvastatin (CRESTOR) 20 MG tablet Take 1 tablet (20 mg total) by mouth every evening. For High Cholesterol 90 tablet 1    SITagliptin phosphate (JANUVIA) 50 MG Tab Take 1 tablet (50 mg total) by mouth once daily. For Diabetes. 90 tablet 0       Social History     Socioeconomic History    Marital status: Single   Occupational History    Occupation:    Tobacco Use    Smoking status: Never    Smokeless tobacco: Never   Substance  "and Sexual Activity    Alcohol use: Never    Drug use: Never    Sexual activity: Yes     Partners: Female     Social Determinants of Health     Financial Resource Strain: Low Risk     Difficulty of Paying Living Expenses: Not hard at all   Food Insecurity: No Food Insecurity    Worried About Running Out of Food in the Last Year: Never true    Ran Out of Food in the Last Year: Never true   Transportation Needs: No Transportation Needs    Lack of Transportation (Medical): No    Lack of Transportation (Non-Medical): No   Stress: No Stress Concern Present    Feeling of Stress : Not at all   Social Connections: Socially Isolated    Frequency of Communication with Friends and Family: Once a week    Frequency of Social Gatherings with Friends and Family: Once a week    Attends Voodoo Services: More than 4 times per year    Active Member of Clubs or Organizations: No    Attends Club or Organization Meetings: Never    Marital Status: Never    Housing Stability: Unknown    Unable to Pay for Housing in the Last Year: No    Unstable Housing in the Last Year: No        Family History   Problem Relation Age of Onset    Diabetes Mother         Subjective:     Review of Systems   Constitutional:  Negative for chills, fever and malaise/fatigue.   Eyes:  Negative for pain and redness.   Respiratory:  Negative for shortness of breath.    Cardiovascular:  Negative for chest pain.   Gastrointestinal:  Negative for abdominal pain, diarrhea, nausea and vomiting.   Musculoskeletal:  Negative for joint pain and myalgias.   Skin:  Negative for itching and rash.        Blister on big toe   Neurological:  Negative for weakness.     Objective:     BP (!) 151/90 (BP Location: Right arm)   Pulse 80   Temp 98.1 °F (36.7 °C)   Resp 18   Ht 6' 2" (1.88 m)   Wt 118.8 kg (262 lb)   SpO2 97%   BMI 33.64 kg/m²     Physical Exam  Constitutional:       General: He is not in acute distress.     Appearance: Normal appearance. He is normal " weight. He is not ill-appearing or toxic-appearing.   HENT:      Head: Normocephalic and atraumatic.      Nose: No congestion or rhinorrhea.   Eyes:      General:         Right eye: No discharge.         Left eye: No discharge.      Extraocular Movements: Extraocular movements intact.      Conjunctiva/sclera: Conjunctivae normal.   Cardiovascular:      Rate and Rhythm: Normal rate.   Chest:      Chest wall: No tenderness.   Skin:     Coloration: Skin is not jaundiced or pale.      Findings: Lesion present. No bruising, erythema or rash.      Comments: 2 cm unroofed blister under left big toe.  No surrounding erythema.  Base is clean.  Minimal epithelialization yet   Neurological:      Mental Status: He is alert and oriented to person, place, and time. Mental status is at baseline.   Psychiatric:         Mood and Affect: Mood normal.         Behavior: Behavior normal.       Assessment & Plan:       ICD-10-CM ICD-9-CM   1. Blister of left foot, initial encounter  S90.822A 917.2   2. Type 2 diabetes mellitus without complication, with long-term current use of insulin  E11.9 250.00    Z79.4 V58.67        1. Blister of left foot, initial encounter  -     levoFLOXacin (LEVAQUIN) 500 MG tablet; Take 1 tablet (500 mg total) by mouth once daily. for 7 days  Dispense: 7 tablet; Refill: 0  -     mupirocin (BACTROBAN) 2 % ointment; Apply topically 3 (three) times daily. for 7 days  Dispense: 22 g; Refill: 1    2. Type 2 diabetes mellitus without complication, with long-term current use of insulin  -     Ambulatory referral/consult to Endocrinology         We will put him on prophylactic Levaquin against Pseudomonas infection which should allay his anxiety, and add topical mupirocin on it in addition.  Had a long discussion about proper wound care, he will use a hydrocolloid dressing and place Vaseline over the daily to minimize scabbing.    Incidentally he brought up that a healthcare provider asked him recently whether or not  he was seeing an endocrinologist for his diabetes, and he was wondering if he can get a referral for that.  We can accommodate him in that regard.  He has an appointment with his PCP in the next couple weeks, and he will talk with her about the proprietary of the referral

## 2023-01-26 NOTE — TELEPHONE ENCOUNTER
Please inform the patient that I will not be in clinic the rest of the week and have no availability until next Wednesday and with his dx of Diabetes I would advise he needs to go to an Urgent Care and have it evaluated today.    Thanks.

## 2023-02-03 ENCOUNTER — PATIENT MESSAGE (OUTPATIENT)
Dept: INTERNAL MEDICINE | Facility: CLINIC | Age: 33
End: 2023-02-03
Payer: COMMERCIAL

## 2023-02-27 ENCOUNTER — OFFICE VISIT (OUTPATIENT)
Dept: ORTHOPEDICS | Facility: CLINIC | Age: 33
End: 2023-02-27
Payer: COMMERCIAL

## 2023-02-27 ENCOUNTER — HOSPITAL ENCOUNTER (OUTPATIENT)
Dept: RADIOLOGY | Facility: CLINIC | Age: 33
Discharge: HOME | End: 2023-02-27
Attending: ORTHOPAEDIC SURGERY
Payer: COMMERCIAL

## 2023-02-27 VITALS
HEIGHT: 74 IN | WEIGHT: 262 LBS | SYSTOLIC BLOOD PRESSURE: 174 MMHG | HEART RATE: 76 BPM | DIASTOLIC BLOOD PRESSURE: 98 MMHG | BODY MASS INDEX: 33.62 KG/M2

## 2023-02-27 DIAGNOSIS — M25.511 ACUTE PAIN OF RIGHT SHOULDER: ICD-10-CM

## 2023-02-27 DIAGNOSIS — S46.811A TRAPEZIUS STRAIN, RIGHT, INITIAL ENCOUNTER: Primary | ICD-10-CM

## 2023-02-27 PROCEDURE — 99203 PR OFFICE/OUTPT VISIT, NEW, LEVL III, 30-44 MIN: ICD-10-PCS | Mod: ,,, | Performed by: NURSE PRACTITIONER

## 2023-02-27 PROCEDURE — 73030 XR SHOULDER COMPLETE 2 OR MORE VIEWS RIGHT: ICD-10-PCS | Mod: RT,,, | Performed by: ORTHOPAEDIC SURGERY

## 2023-02-27 PROCEDURE — 99203 OFFICE O/P NEW LOW 30 MIN: CPT | Mod: ,,, | Performed by: NURSE PRACTITIONER

## 2023-02-27 PROCEDURE — 73030 X-RAY EXAM OF SHOULDER: CPT | Mod: RT,,, | Performed by: ORTHOPAEDIC SURGERY

## 2023-02-27 RX ORDER — NAPROXEN 500 MG/1
500 TABLET ORAL 2 TIMES DAILY
Qty: 28 TABLET | Refills: 2 | Status: SHIPPED | OUTPATIENT
Start: 2023-02-27

## 2023-02-27 NOTE — PROGRESS NOTES
Chief Complaint:   Chief Complaint   Patient presents with    Right Shoulder - Pain     Off and on shoulder pain. Hurts near collar bone/neck area. Some days he hardly feels it and some days it's severe pain. Has good ROM. Feels like something is pulling down on his muscles.        Consulting Physician: No ref. provider found    History of present illness:    he  is a pleasant 32 y.o. year old male with right shoulder pain since January 2023. He denies injury. His pain is superiorly along the trapezius. It radiates down anteriorly across the chest. He denies numbness, tingling, or neck issues. He was recently placed on Naprosyn which helped, but his pain returned when he ran out of medication.     Past Medical History:   Diagnosis Date    DM (diabetes mellitus), type 2     HTN (hypertension)        History reviewed. No pertinent surgical history.    Current Outpatient Medications   Medication Sig    HUMALOG KWIKPEN INSULIN 100 unit/mL pen Inject 10 Units into the skin 2 (two) times daily with meals.    LANTUS SOLOSTAR U-100 INSULIN glargine 100 units/mL SubQ pen Inject 40 Units into the skin every evening. For Diabetes.    lisinopriL (PRINIVIL,ZESTRIL) 20 MG tablet Take 1 tablet (20 mg total) by mouth once daily. For High Blood Pressure (Patient not taking: Reported on 1/18/2023)    metFORMIN (GLUCOPHAGE) 1000 MG tablet Take 1 tablet (1,000 mg total) by mouth 2 (two) times daily with meals. For Diabetes    naproxen (NAPROSYN) 500 MG tablet Take 1 tablet (500 mg total) by mouth 2 (two) times daily.    rosuvastatin (CRESTOR) 20 MG tablet Take 1 tablet (20 mg total) by mouth every evening. For High Cholesterol    SITagliptin phosphate (JANUVIA) 50 MG Tab Take 1 tablet (50 mg total) by mouth once daily. For Diabetes.    VIAGRA 100 mg tablet Take 100 mg by mouth once daily.     No current facility-administered medications for this visit.       Review of patient's allergies indicates:  No Known Allergies    Family History    Problem Relation Age of Onset    Diabetes Mother        Social History     Socioeconomic History    Marital status: Single   Occupational History    Occupation:    Tobacco Use    Smoking status: Never    Smokeless tobacco: Never   Substance and Sexual Activity    Alcohol use: Never    Drug use: Never    Sexual activity: Yes     Partners: Female     Social Determinants of Health     Financial Resource Strain: Low Risk     Difficulty of Paying Living Expenses: Not hard at all   Food Insecurity: No Food Insecurity    Worried About Running Out of Food in the Last Year: Never true    Ran Out of Food in the Last Year: Never true   Transportation Needs: No Transportation Needs    Lack of Transportation (Medical): No    Lack of Transportation (Non-Medical): No   Stress: No Stress Concern Present    Feeling of Stress : Not at all   Social Connections: Socially Isolated    Frequency of Communication with Friends and Family: Once a week    Frequency of Social Gatherings with Friends and Family: Once a week    Attends Restorationism Services: More than 4 times per year    Active Member of Clubs or Organizations: No    Attends Club or Organization Meetings: Never    Marital Status: Never    Housing Stability: Unknown    Unable to Pay for Housing in the Last Year: No    Unstable Housing in the Last Year: No       Review of Systems:    Constitution:   Denies chills, fever, and sweats.  HENT:   Denies headaches or blurry vision.  Cardiovascular:  Denies chest pain or irregular heart beat.  Respiratory:   Denies cough or shortness of breath.  Gastrointestinal:  Denies abdominal pain, nausea, or vomiting.  Musculoskeletal:   Denies muscle cramps.  Neurological:   Denies dizziness or focal weakness.  Psychiatric/Behavior: Normal mental status.  Hematology/Lymph:  Denies bleeding problem or easy bruising/bleeding.  Skin:    Denies rash or suspicious lesions.    Examination:    Vital Signs:    Vitals:    02/27/23 1542  "  BP: (!) 174/98   Pulse: 76   Weight: 118.8 kg (262 lb)   Height: 6' 2" (1.88 m)   PainSc:   8       Body mass index is 33.64 kg/m².    Constitution:   Well-developed, well nourished patient in no acute distress.  Neurological:   Alert and oriented x 3 and cooperative to examination.     Psychiatric/Behavior: Normal mental status.  Respiratory:   No shortness of breath.  Eyes:    Extraoccular muscles intact  Skin:    No scars, rash or suspicious lesions.    MSK:   Shoulder Exam:                   Right        Left  Skin:                                   Normal     Normal  AC joint tenderness:           None         None  Forward Flexion:                180            180  Abduction:                          180           180  External Rotation:               80              80  Internal Rotation:                80             80  Supraspinatus stress test: Neg           Neg  Hawkin's Impingement:     Neg           Neg  Neer Impingement:            Neg           Neg  Apprehension:                   Neg           Neg  Gregory's:                           Neg           Neg  Speed's test:                     Neg            Neg  Strength:  External Rotation:           5/5                5/5  Lift Off/belly press:          5/5                5/5    N-V status:                   Intact             Intact    C-spine: Normal ROM, NT  Mild TTP over trapezius muscle       Imaging: X-rays ordered and images interpreted today personally by me of 4 views of the right shoulder show normal bony alignment       Assessment: Trapezius strain, right, initial encounter  -     X-ray Shoulder 2 or More Views Right; Future; Expected date: 02/27/2023    Other orders  -     naproxen (NAPROSYN) 500 MG tablet; Take 1 tablet (500 mg total) by mouth 2 (two) times daily.  Dispense: 28 tablet; Refill: 2        Plan:  Possible trapezius muscle strain. We will restart him on Naprosyn and refer him to formal therapy. He can follow up if he has any " issues.

## 2023-03-07 ENCOUNTER — TELEPHONE (OUTPATIENT)
Dept: ORTHOPEDICS | Facility: CLINIC | Age: 33
End: 2023-03-07
Payer: COMMERCIAL

## 2023-03-23 DIAGNOSIS — E11.9 TYPE 2 DIABETES MELLITUS WITHOUT COMPLICATION, WITH LONG-TERM CURRENT USE OF INSULIN: Chronic | ICD-10-CM

## 2023-03-23 DIAGNOSIS — Z79.4 TYPE 2 DIABETES MELLITUS WITHOUT COMPLICATION, WITH LONG-TERM CURRENT USE OF INSULIN: Chronic | ICD-10-CM

## 2023-03-23 DIAGNOSIS — R53.83 FATIGUE, UNSPECIFIED TYPE: Primary | ICD-10-CM

## 2023-03-23 NOTE — TELEPHONE ENCOUNTER
30 day supply approved.    The pt missed his LOV with me in February which was supposed to be a virtual OV. Pt needs a new f/u with me  - it needs to be F2F and he needs to complete labs prior that I have reordered.    Thanks.

## 2023-04-18 ENCOUNTER — LAB VISIT (OUTPATIENT)
Dept: LAB | Facility: HOSPITAL | Age: 33
End: 2023-04-18
Attending: NURSE PRACTITIONER
Payer: COMMERCIAL

## 2023-04-18 DIAGNOSIS — Z79.4 TYPE 2 DIABETES MELLITUS WITHOUT COMPLICATION, WITH LONG-TERM CURRENT USE OF INSULIN: ICD-10-CM

## 2023-04-18 DIAGNOSIS — R53.83 FATIGUE, UNSPECIFIED TYPE: ICD-10-CM

## 2023-04-18 DIAGNOSIS — E11.9 TYPE 2 DIABETES MELLITUS WITHOUT COMPLICATION, WITH LONG-TERM CURRENT USE OF INSULIN: ICD-10-CM

## 2023-04-18 LAB
ANION GAP SERPL CALC-SCNC: 9 MEQ/L
APPEARANCE UR: CLEAR
BACTERIA #/AREA URNS AUTO: ABNORMAL /HPF
BILIRUB UR QL STRIP.AUTO: NEGATIVE MG/DL
BUN SERPL-MCNC: 13.2 MG/DL (ref 8.9–20.6)
CALCIUM SERPL-MCNC: 10 MG/DL (ref 8.4–10.2)
CHLORIDE SERPL-SCNC: 104 MMOL/L (ref 98–107)
CO2 SERPL-SCNC: 27 MMOL/L (ref 22–29)
COLOR UR AUTO: ABNORMAL
CREAT SERPL-MCNC: 0.85 MG/DL (ref 0.73–1.18)
CREAT UR-MCNC: 133.3 MG/DL (ref 63–166)
CREAT/UREA NIT SERPL: 16
EST. AVERAGE GLUCOSE BLD GHB EST-MCNC: 168.6 MG/DL
FSH SERPL-ACNC: 4.13 MIU/ML
GFR SERPLBLD CREATININE-BSD FMLA CKD-EPI: >60 MLS/MIN/1.73/M2
GLUCOSE SERPL-MCNC: 72 MG/DL (ref 74–100)
GLUCOSE UR QL STRIP.AUTO: NORMAL MG/DL
HBA1C MFR BLD: 7.5 %
HYALINE CASTS #/AREA URNS LPF: ABNORMAL /LPF
KETONES UR QL STRIP.AUTO: NEGATIVE MG/DL
LEUKOCYTE ESTERASE UR QL STRIP.AUTO: 500 UNIT/L
MICROALBUMIN UR-MCNC: 114.8 UG/ML
MICROALBUMIN/CREAT RATIO PNL UR: 86.1 MG/GM CR (ref 0–30)
MUCOUS THREADS URNS QL MICRO: ABNORMAL /LPF
NITRITE UR QL STRIP.AUTO: NEGATIVE
PH UR STRIP.AUTO: 6 [PH]
POTASSIUM SERPL-SCNC: 4.1 MMOL/L (ref 3.5–5.1)
PROT UR QL STRIP.AUTO: ABNORMAL MG/DL
RBC #/AREA URNS AUTO: ABNORMAL /HPF
RBC UR QL AUTO: NEGATIVE UNIT/L
SODIUM SERPL-SCNC: 140 MMOL/L (ref 136–145)
SP GR UR STRIP.AUTO: 1.02
SQUAMOUS #/AREA URNS LPF: ABNORMAL /HPF
TESTOST SERPL-MCNC: 335.94 NG/DL (ref 240.24–870.68)
UROBILINOGEN UR STRIP-ACNC: NORMAL MG/DL
WBC #/AREA URNS AUTO: ABNORMAL /HPF

## 2023-04-18 PROCEDURE — 87088 URINE BACTERIA CULTURE: CPT

## 2023-04-18 PROCEDURE — 82043 UR ALBUMIN QUANTITATIVE: CPT

## 2023-04-18 PROCEDURE — 80048 BASIC METABOLIC PNL TOTAL CA: CPT

## 2023-04-18 PROCEDURE — 84403 ASSAY OF TOTAL TESTOSTERONE: CPT

## 2023-04-18 PROCEDURE — 83036 HEMOGLOBIN GLYCOSYLATED A1C: CPT

## 2023-04-18 PROCEDURE — 81001 URINALYSIS AUTO W/SCOPE: CPT

## 2023-04-18 PROCEDURE — 36415 COLL VENOUS BLD VENIPUNCTURE: CPT

## 2023-04-18 PROCEDURE — 83001 ASSAY OF GONADOTROPIN (FSH): CPT

## 2023-04-21 ENCOUNTER — TELEPHONE (OUTPATIENT)
Dept: INTERNAL MEDICINE | Facility: CLINIC | Age: 33
End: 2023-04-21
Payer: COMMERCIAL

## 2023-04-21 LAB — BACTERIA UR CULT: ABNORMAL

## 2023-04-21 RX ORDER — AMPICILLIN 500 MG/1
500 CAPSULE ORAL 2 TIMES DAILY
Qty: 14 CAPSULE | Refills: 0 | Status: SHIPPED | OUTPATIENT
Start: 2023-04-21 | End: 2023-05-08 | Stop reason: ALTCHOICE

## 2023-04-21 NOTE — TELEPHONE ENCOUNTER
Please inform that that he has a UTI. Pathogen same as the one seen on 11/8/22 but I don't see what he was treated with. Will send Ampicillin. Complete all and drink plenty of water. F/u with Handy NP as scheduled.      Urine Culture, Routine 25,000-50,000 colonies/ml Streptococcus agalactiae (Group B) Abnormal             Resulting Agency: [facility]     Susceptibility     Streptococcus agalactiae (Group B)     Not Specified     Ampicillin <=0.25  Sensitive     Clindamycin >=1  Resistant     Erythromycin >=8  Resistant     Penicillin <=0.06  Sensitive     Tetracycline >=16  Resistant     Vancomycin 0.5  Sensitive               Linear View

## 2023-04-24 ENCOUNTER — PATIENT MESSAGE (OUTPATIENT)
Dept: INTERNAL MEDICINE | Facility: CLINIC | Age: 33
End: 2023-04-24
Payer: COMMERCIAL

## 2023-05-08 ENCOUNTER — OFFICE VISIT (OUTPATIENT)
Dept: INTERNAL MEDICINE | Facility: CLINIC | Age: 33
End: 2023-05-08
Payer: COMMERCIAL

## 2023-05-08 VITALS
TEMPERATURE: 98 F | DIASTOLIC BLOOD PRESSURE: 82 MMHG | RESPIRATION RATE: 16 BRPM | SYSTOLIC BLOOD PRESSURE: 126 MMHG | HEIGHT: 74 IN | WEIGHT: 272.19 LBS | HEART RATE: 76 BPM | BODY MASS INDEX: 34.93 KG/M2

## 2023-05-08 DIAGNOSIS — Z00.00 WELLNESS EXAMINATION: Primary | ICD-10-CM

## 2023-05-08 DIAGNOSIS — Z79.4 TYPE 2 DIABETES MELLITUS WITHOUT COMPLICATION, WITH LONG-TERM CURRENT USE OF INSULIN: ICD-10-CM

## 2023-05-08 DIAGNOSIS — E78.2 MIXED HYPERLIPIDEMIA: ICD-10-CM

## 2023-05-08 DIAGNOSIS — E11.9 TYPE 2 DIABETES MELLITUS WITHOUT COMPLICATION, WITH LONG-TERM CURRENT USE OF INSULIN: ICD-10-CM

## 2023-05-08 DIAGNOSIS — I10 PRIMARY HYPERTENSION: ICD-10-CM

## 2023-05-08 PROBLEM — R00.2 HEART PALPITATIONS: Status: RESOLVED | Noted: 2022-05-13 | Resolved: 2023-05-08

## 2023-05-08 PROCEDURE — 99395 PREV VISIT EST AGE 18-39: CPT | Mod: S$PBB,,, | Performed by: NURSE PRACTITIONER

## 2023-05-08 PROCEDURE — 99214 OFFICE O/P EST MOD 30 MIN: CPT | Mod: PBBFAC | Performed by: NURSE PRACTITIONER

## 2023-05-08 PROCEDURE — 99395 PR PREVENTIVE VISIT,EST,18-39: ICD-10-PCS | Mod: S$PBB,,, | Performed by: NURSE PRACTITIONER

## 2023-05-08 RX ORDER — AMPICILLIN 500 MG/1
CAPSULE ORAL
COMMUNITY
Start: 2023-05-04 | End: 2023-05-08 | Stop reason: ALTCHOICE

## 2023-05-08 RX ORDER — INSULIN GLARGINE 100 [IU]/ML
40 INJECTION, SOLUTION SUBCUTANEOUS NIGHTLY
Qty: 3 ML | Refills: 6 | Status: SHIPPED | OUTPATIENT
Start: 2023-05-08 | End: 2024-02-14 | Stop reason: SDUPTHER

## 2023-05-08 RX ORDER — SILDENAFIL CITRATE 100 MG/1
100 TABLET, FILM COATED ORAL DAILY
Qty: 30 TABLET | Refills: 5 | Status: SHIPPED | OUTPATIENT
Start: 2023-05-08 | End: 2024-02-21 | Stop reason: SDUPTHER

## 2023-05-08 RX ORDER — METFORMIN HYDROCHLORIDE 1000 MG/1
1000 TABLET ORAL 2 TIMES DAILY WITH MEALS
Qty: 180 TABLET | Refills: 2 | Status: SHIPPED | OUTPATIENT
Start: 2023-05-08 | End: 2024-02-21 | Stop reason: SDUPTHER

## 2023-05-08 RX ORDER — INSULIN LISPRO 100 [IU]/ML
10 INJECTION, SOLUTION INTRAVENOUS; SUBCUTANEOUS
Qty: 18 ML | Refills: 6 | Status: SHIPPED | OUTPATIENT
Start: 2023-05-08 | End: 2024-02-21 | Stop reason: SDUPTHER

## 2023-05-08 RX ORDER — PEN NEEDLE, DIABETIC 30 GX3/16"
NEEDLE, DISPOSABLE MISCELLANEOUS
Qty: 100 EACH | Refills: 6 | Status: SHIPPED | OUTPATIENT
Start: 2023-05-08 | End: 2024-02-21 | Stop reason: SDUPTHER

## 2023-05-08 RX ORDER — ROSUVASTATIN CALCIUM 20 MG/1
20 TABLET, COATED ORAL NIGHTLY
Qty: 90 TABLET | Refills: 2 | Status: SHIPPED | OUTPATIENT
Start: 2023-05-08 | End: 2024-02-21 | Stop reason: SDUPTHER

## 2023-05-08 NOTE — PROGRESS NOTES
Tonja Tavares, SHRADDHA   [facility]  [address] [facility] [address]      PATIENT NAME: Rodrigo Laguerre  : 1990  DATE: 23  MRN: [id number]      Reason for Visit / Chief Complaint: Follow-up (Lab review )       History of Present Illness / Problem Focused Workflow     Rodrigo Laguerre presents to the clinic with Follow-up (Lab review )     30 yo male here today T2DM f/u via audio virtual. T2DM, HTN, and HLD.     Osteoporosis Screening - 22 Vitamin D level 56.3  Diabetic Screening - 22 Fundal Exam . 22 Foot Exam  STI Screening -   Eye Screening - [facility]  Dental Screening - Dr. Zay Rodriguez        2023  Pt here today for f/u, missed his 3 month f/u in January; LOV his Januvia was increased to 50 mg daily. The pt reports today he is seeing Dr. Bolden now who is managing his Diabetes at this time. A1C decreased from 8.9 to 7.5. Pt reports he stopped taking his Lisinopril due to cough, has been monitoring his BP at home and it is good, today BP is WNL. Will complete Fundal & Foot exams today. Will f/u in 8 months for routine f/u for wellness.    Denies chest pain, shortness of breath, cough, fever, headache, dizziness, weakness, abdominal pain, nausea, vomiting, diarrhea, constipation, black/bloody stools, unplanned weight loss, night sweats, changes in urinary patterns, burning/odor with urination, depression, anxiety, and SI/HI.       Follow-up        Review of Systems     Review of Systems   Constitutional: Negative.    HENT: Negative.     Eyes: Negative.    Respiratory: Negative.     Cardiovascular: Negative.    Gastrointestinal: Negative.    Endocrine: Negative.    Genitourinary: Negative.    Neurological: Negative.    Psychiatric/Behavioral: Negative.         Medications and Allergies     Medications  Medication List with Changes/Refills   Current Medications    AMPICILLIN (PRINCIPEN) 500 MG CAPSULE    Take by mouth.     HUMALOG KWIKPEN INSULIN 100 UNIT/ML PEN    Inject 10 Units into the skin 2 (two) times daily with meals.    LANTUS SOLOSTAR U-100 INSULIN GLARGINE 100 UNITS/ML SUBQ PEN    Inject 40 Units into the skin every evening. For Diabetes.    LISINOPRIL (PRINIVIL,ZESTRIL) 20 MG TABLET    Take 1 tablet (20 mg total) by mouth once daily. For High Blood Pressure    METFORMIN (GLUCOPHAGE) 1000 MG TABLET    Take 1 tablet (1,000 mg total) by mouth 2 (two) times daily with meals. For Diabetes    NAPROXEN (NAPROSYN) 500 MG TABLET    Take 1 tablet (500 mg total) by mouth 2 (two) times daily.    ROSUVASTATIN (CRESTOR) 20 MG TABLET    Take 1 tablet (20 mg total) by mouth every evening. For High Cholesterol    SITAGLIPTIN PHOSPHATE (JANUVIA) 50 MG TAB    Take 1 tablet (50 mg total) by mouth once daily. For Diabetes.    VIAGRA 100 MG TABLET    Take 100 mg by mouth once daily.         Allergies  Review of patient's allergies indicates:  No Known Allergies    Physical Examination     Vitals:    05/08/23 1353   BP: 126/82   Pulse: 76   Resp: 16   Temp: 98.1 °F (36.7 °C)     Physical Exam  Constitutional:       Appearance: Normal appearance.   Cardiovascular:      Rate and Rhythm: Normal rate and regular rhythm.      Pulses:           Dorsalis pedis pulses are 2+ on the right side and 2+ on the left side.        Posterior tibial pulses are 2+ on the right side and 2+ on the left side.   Pulmonary:      Effort: Pulmonary effort is normal.      Breath sounds: Normal breath sounds.   Musculoskeletal:         General: Normal range of motion.      Cervical back: Normal range of motion.   Feet:      Right foot:      Protective Sensation: 9 sites tested.  9 sites sensed.      Skin integrity: Callus and dry skin present.      Toenail Condition: Right toenails are abnormally thick. Fungal disease present.     Left foot:      Protective Sensation: 9 sites tested.  9 sites sensed.      Skin integrity: Callus and dry skin present.      Toenail  Condition: Left toenails are abnormally thick. Fungal disease present.  Skin:     General: Skin is warm and dry.   Neurological:      General: No focal deficit present.      Mental Status: He is alert and oriented to person, place, and time.   Psychiatric:         Mood and Affect: Mood normal.         Results     Lab Results   Component Value Date    WBC 8.2 11/08/2022    RBC 5.48 11/08/2022    HGB 11.7 (L) 11/08/2022    HCT 37.9 (L) 11/08/2022    MCV 69.2 (L) 11/08/2022    MCH 21.4 (L) 11/08/2022    MCHC 30.9 (L) 11/08/2022    RDW 15.2 11/08/2022     11/08/2022    MPV 12.1 (H) 11/08/2022     Sodium Level   Date Value Ref Range Status   04/18/2023 140 136 - 145 mmol/L Final     Potassium Level   Date Value Ref Range Status   04/18/2023 4.1 3.5 - 5.1 mmol/L Final     Carbon Dioxide   Date Value Ref Range Status   04/18/2023 27 22 - 29 mmol/L Final     Blood Urea Nitrogen   Date Value Ref Range Status   04/18/2023 13.2 8.9 - 20.6 mg/dL Final     Creatinine   Date Value Ref Range Status   04/18/2023 0.85 0.73 - 1.18 mg/dL Final     Calcium Level Total   Date Value Ref Range Status   04/18/2023 10.0 8.4 - 10.2 mg/dL Final     Albumin Level   Date Value Ref Range Status   11/08/2022 4.3 3.5 - 5.0 gm/dL Final     Bilirubin Total   Date Value Ref Range Status   11/08/2022 0.4 <=1.5 mg/dL Final     Alkaline Phosphatase   Date Value Ref Range Status   11/08/2022 45 40 - 150 unit/L Final     Aspartate Aminotransferase   Date Value Ref Range Status   11/08/2022 26 5 - 34 unit/L Final     Alanine Aminotransferase   Date Value Ref Range Status   11/08/2022 36 0 - 55 unit/L Final     Estimated GFR-Non    Date Value Ref Range Status   10/19/2019 >105 >>=90 mL/min Final     Lab Results   Component Value Date    CHOL 142 11/08/2022     Lab Results   Component Value Date    HDL 32 (L) 11/08/2022     No results found for: LDLCALC  Lab Results   Component Value Date    TRIG 114 11/08/2022     No results found  for: CHOLHDL  Lab Results   Component Value Date    TSH 4.130 (H) 10/19/2019     Lab Results   Component Value Date    PROTEINUA Trace (A) 04/18/2023    LEUKOCYTESUR 500 (A) 04/18/2023     Lab Results   Component Value Date    HGBA1C 7.5 (H) 04/18/2023    HGBA1C 8.9 (H) 11/08/2022    HGBA1C 9.6 (H) 08/02/2022           Assessment       No diagnosis found.    Plan      Problem List Items Addressed This Visit    None      No future appointments.         Signature:     [facility]  [address] [facility] [place] [address]    Date of encounter: 5/8/23

## 2023-05-08 NOTE — ASSESSMENT & PLAN NOTE
A1C 7.5 At Goal  Continue RX metformin 1000 mg BID, Januvia 50 mg daily, Lantus 40 units q hs  & Humalog 10 units TID with meals  Continue f/u with Dr. Bolden as directed

## 2023-06-19 ENCOUNTER — PATIENT MESSAGE (OUTPATIENT)
Dept: ADMINISTRATIVE | Facility: HOSPITAL | Age: 33
End: 2023-06-19
Payer: COMMERCIAL

## 2023-08-07 PROBLEM — Z00.00 WELLNESS EXAMINATION: Status: RESOLVED | Noted: 2023-05-08 | Resolved: 2023-08-07

## 2023-12-31 ENCOUNTER — PATIENT MESSAGE (OUTPATIENT)
Dept: INTERNAL MEDICINE | Facility: CLINIC | Age: 33
End: 2023-12-31
Payer: COMMERCIAL

## 2024-01-22 ENCOUNTER — LAB VISIT (OUTPATIENT)
Dept: LAB | Facility: HOSPITAL | Age: 34
End: 2024-01-22
Attending: NURSE PRACTITIONER
Payer: COMMERCIAL

## 2024-01-22 DIAGNOSIS — Z79.4 TYPE 2 DIABETES MELLITUS WITHOUT COMPLICATION, WITH LONG-TERM CURRENT USE OF INSULIN: ICD-10-CM

## 2024-01-22 DIAGNOSIS — E11.9 TYPE 2 DIABETES MELLITUS WITHOUT COMPLICATION, WITH LONG-TERM CURRENT USE OF INSULIN: ICD-10-CM

## 2024-01-22 DIAGNOSIS — Z00.00 WELLNESS EXAMINATION: ICD-10-CM

## 2024-01-22 LAB
ALBUMIN SERPL-MCNC: 4.2 G/DL (ref 3.5–5)
ALBUMIN/GLOB SERPL: 1 RATIO (ref 1.1–2)
ALP SERPL-CCNC: 46 UNIT/L (ref 40–150)
ALT SERPL-CCNC: 33 UNIT/L (ref 0–55)
APPEARANCE UR: CLEAR
AST SERPL-CCNC: 24 UNIT/L (ref 5–34)
BACTERIA #/AREA URNS AUTO: ABNORMAL /HPF
BASOPHILS # BLD AUTO: 0.03 X10(3)/MCL
BASOPHILS NFR BLD AUTO: 0.4 %
BILIRUB SERPL-MCNC: 0.4 MG/DL
BILIRUB UR QL STRIP.AUTO: NEGATIVE
BUN SERPL-MCNC: 9.4 MG/DL (ref 8.9–20.6)
CALCIUM SERPL-MCNC: 9.4 MG/DL (ref 8.4–10.2)
CHLORIDE SERPL-SCNC: 105 MMOL/L (ref 98–107)
CHOLEST SERPL-MCNC: 136 MG/DL
CHOLEST/HDLC SERPL: 4 {RATIO} (ref 0–5)
CO2 SERPL-SCNC: 27 MMOL/L (ref 22–29)
COLOR UR AUTO: ABNORMAL
CREAT SERPL-MCNC: 0.85 MG/DL (ref 0.73–1.18)
DEPRECATED CALCIDIOL+CALCIFEROL SERPL-MC: 28.8 NG/ML (ref 30–80)
EOSINOPHIL # BLD AUTO: 0.11 X10(3)/MCL (ref 0–0.9)
EOSINOPHIL NFR BLD AUTO: 1.3 %
ERYTHROCYTE [DISTWIDTH] IN BLOOD BY AUTOMATED COUNT: 15.7 % (ref 11.5–17)
EST. AVERAGE GLUCOSE BLD GHB EST-MCNC: 162.8 MG/DL
GFR SERPLBLD CREATININE-BSD FMLA CKD-EPI: >60 MLS/MIN/1.73/M2
GLOBULIN SER-MCNC: 4.1 GM/DL (ref 2.4–3.5)
GLUCOSE SERPL-MCNC: 104 MG/DL (ref 74–100)
GLUCOSE UR QL STRIP.AUTO: NORMAL
HBA1C MFR BLD: 7.3 %
HCT VFR BLD AUTO: 37.8 % (ref 42–52)
HDLC SERPL-MCNC: 35 MG/DL (ref 35–60)
HGB BLD-MCNC: 11.9 G/DL (ref 14–18)
HYALINE CASTS #/AREA URNS LPF: ABNORMAL /LPF
IMM GRANULOCYTES # BLD AUTO: 0.01 X10(3)/MCL (ref 0–0.04)
IMM GRANULOCYTES NFR BLD AUTO: 0.1 %
KETONES UR QL STRIP.AUTO: NEGATIVE
LDLC SERPL CALC-MCNC: 84 MG/DL (ref 50–140)
LEUKOCYTE ESTERASE UR QL STRIP.AUTO: 250
LYMPHOCYTES # BLD AUTO: 3.09 X10(3)/MCL (ref 0.6–4.6)
LYMPHOCYTES NFR BLD AUTO: 36.8 %
MCH RBC QN AUTO: 22.1 PG (ref 27–31)
MCHC RBC AUTO-ENTMCNC: 31.5 G/DL (ref 33–36)
MCV RBC AUTO: 70.3 FL (ref 80–94)
MONOCYTES # BLD AUTO: 0.52 X10(3)/MCL (ref 0.1–1.3)
MONOCYTES NFR BLD AUTO: 6.2 %
MUCOUS THREADS URNS QL MICRO: ABNORMAL /LPF
NEUTROPHILS # BLD AUTO: 4.63 X10(3)/MCL (ref 2.1–9.2)
NEUTROPHILS NFR BLD AUTO: 55.2 %
NITRITE UR QL STRIP.AUTO: NEGATIVE
NRBC BLD AUTO-RTO: 0 %
PH UR STRIP.AUTO: 7 [PH]
PLATELET # BLD AUTO: 292 X10(3)/MCL (ref 130–400)
PMV BLD AUTO: 11.3 FL (ref 7.4–10.4)
POTASSIUM SERPL-SCNC: 4.3 MMOL/L (ref 3.5–5.1)
PROT SERPL-MCNC: 8.3 GM/DL (ref 6.4–8.3)
PROT UR QL STRIP.AUTO: ABNORMAL
RBC # BLD AUTO: 5.38 X10(6)/MCL (ref 4.7–6.1)
RBC #/AREA URNS AUTO: ABNORMAL /HPF
RBC UR QL AUTO: NEGATIVE
SODIUM SERPL-SCNC: 140 MMOL/L (ref 136–145)
SP GR UR STRIP.AUTO: 1.01 (ref 1–1.03)
SQUAMOUS #/AREA URNS LPF: ABNORMAL /HPF
TRIGL SERPL-MCNC: 87 MG/DL (ref 34–140)
TSH SERPL-ACNC: 1.99 UIU/ML (ref 0.35–4.94)
UROBILINOGEN UR STRIP-ACNC: NORMAL
VLDLC SERPL CALC-MCNC: 17 MG/DL
WBC # SPEC AUTO: 8.39 X10(3)/MCL (ref 4.5–11.5)
WBC #/AREA URNS AUTO: ABNORMAL /HPF

## 2024-01-22 PROCEDURE — 80061 LIPID PANEL: CPT

## 2024-01-22 PROCEDURE — 84443 ASSAY THYROID STIM HORMONE: CPT

## 2024-01-22 PROCEDURE — 80053 COMPREHEN METABOLIC PANEL: CPT

## 2024-01-22 PROCEDURE — 82306 VITAMIN D 25 HYDROXY: CPT

## 2024-01-22 PROCEDURE — 87086 URINE CULTURE/COLONY COUNT: CPT

## 2024-01-22 PROCEDURE — 85025 COMPLETE CBC W/AUTO DIFF WBC: CPT

## 2024-01-22 PROCEDURE — 81001 URINALYSIS AUTO W/SCOPE: CPT

## 2024-01-22 PROCEDURE — 83036 HEMOGLOBIN GLYCOSYLATED A1C: CPT

## 2024-01-22 PROCEDURE — 36415 COLL VENOUS BLD VENIPUNCTURE: CPT

## 2024-01-24 LAB — BACTERIA UR CULT: NO GROWTH

## 2024-02-14 DIAGNOSIS — Z79.4 TYPE 2 DIABETES MELLITUS WITHOUT COMPLICATION, WITH LONG-TERM CURRENT USE OF INSULIN: ICD-10-CM

## 2024-02-14 DIAGNOSIS — E11.9 TYPE 2 DIABETES MELLITUS WITHOUT COMPLICATION, WITH LONG-TERM CURRENT USE OF INSULIN: ICD-10-CM

## 2024-02-15 ENCOUNTER — TELEPHONE (OUTPATIENT)
Dept: INTERNAL MEDICINE | Facility: CLINIC | Age: 34
End: 2024-02-15
Payer: COMMERCIAL

## 2024-02-15 RX ORDER — INSULIN GLARGINE 100 [IU]/ML
40 INJECTION, SOLUTION SUBCUTANEOUS NIGHTLY
Qty: 3 ML | Refills: 0 | Status: SHIPPED | OUTPATIENT
Start: 2024-02-15 | End: 2024-06-07 | Stop reason: SDUPTHER

## 2024-02-16 DIAGNOSIS — E78.2 MIXED HYPERLIPIDEMIA: ICD-10-CM

## 2024-02-16 DIAGNOSIS — Z79.4 TYPE 2 DIABETES MELLITUS WITHOUT COMPLICATION, WITH LONG-TERM CURRENT USE OF INSULIN: ICD-10-CM

## 2024-02-16 DIAGNOSIS — E11.9 TYPE 2 DIABETES MELLITUS WITHOUT COMPLICATION, WITH LONG-TERM CURRENT USE OF INSULIN: ICD-10-CM

## 2024-02-17 RX ORDER — METFORMIN HYDROCHLORIDE 1000 MG/1
1000 TABLET ORAL 2 TIMES DAILY WITH MEALS
Qty: 180 TABLET | Refills: 2 | OUTPATIENT
Start: 2024-02-17 | End: 2024-11-13

## 2024-02-17 RX ORDER — ROSUVASTATIN CALCIUM 20 MG/1
20 TABLET, COATED ORAL NIGHTLY
Qty: 90 TABLET | Refills: 2 | OUTPATIENT
Start: 2024-02-17 | End: 2024-11-13

## 2024-02-17 RX ORDER — PEN NEEDLE, DIABETIC 30 GX3/16"
NEEDLE, DISPOSABLE MISCELLANEOUS
Qty: 100 EACH | Refills: 6 | OUTPATIENT
Start: 2024-02-17

## 2024-02-20 ENCOUNTER — TELEPHONE (OUTPATIENT)
Dept: INTERNAL MEDICINE | Facility: CLINIC | Age: 34
End: 2024-02-20
Payer: COMMERCIAL

## 2024-02-20 ENCOUNTER — PATIENT MESSAGE (OUTPATIENT)
Dept: INTERNAL MEDICINE | Facility: CLINIC | Age: 34
End: 2024-02-20
Payer: COMMERCIAL

## 2024-02-20 NOTE — TELEPHONE ENCOUNTER
----- Message from Ginger Matthews sent at 2/20/2024  1:20 PM CST -----  Regarding: refill request  Type:  RX Refill Request    Who Called: pt    Refill or New Rx: refill    RX Name and Strength: metFORMIN (GLUCOPHAGE) 1000 MG tablet  How is the patient currently taking it? (ex. 1XDay): one day  Is this a 30 day or 90 day RX: 180    RX Name and Strength: rosuvastatin (CRESTOR) 20 MG tablet   How is the patient currently taking it? (ex. 1XDay): one day  Is this a 30 day or 90 day RX: 90    RX Name and Strength: SITagliptin phosphate (JANUVIA) 50 MG Tab  How is the patient currently taking it? (ex. 1XDay): one day  Is this a 30 day or 90 day RX: 90    Preferred Pharmacy with phone number: [facility] pharmacy    Local or Mail Order: local    Ordering Provider: handy    Would the patient rather a call back or a response via [facility]?  Yes if needed    Best Call Back Number:[phone]    Additional Information:  refill request, please advise, thanks

## 2024-02-21 ENCOUNTER — PATIENT MESSAGE (OUTPATIENT)
Dept: INTERNAL MEDICINE | Facility: CLINIC | Age: 34
End: 2024-02-21

## 2024-02-21 ENCOUNTER — OFFICE VISIT (OUTPATIENT)
Dept: INTERNAL MEDICINE | Facility: CLINIC | Age: 34
End: 2024-02-21
Payer: COMMERCIAL

## 2024-02-21 VITALS — SYSTOLIC BLOOD PRESSURE: 126 MMHG | DIASTOLIC BLOOD PRESSURE: 83 MMHG

## 2024-02-21 DIAGNOSIS — Z79.4 TYPE 2 DIABETES MELLITUS WITHOUT COMPLICATION, WITH LONG-TERM CURRENT USE OF INSULIN: Primary | ICD-10-CM

## 2024-02-21 DIAGNOSIS — N52.9 ERECTILE DYSFUNCTION, UNSPECIFIED ERECTILE DYSFUNCTION TYPE: ICD-10-CM

## 2024-02-21 DIAGNOSIS — E55.9 VITAMIN D DEFICIENCY: ICD-10-CM

## 2024-02-21 DIAGNOSIS — D64.9 ANEMIA, UNSPECIFIED TYPE: ICD-10-CM

## 2024-02-21 DIAGNOSIS — E11.9 TYPE 2 DIABETES MELLITUS WITHOUT COMPLICATION, WITH LONG-TERM CURRENT USE OF INSULIN: Primary | ICD-10-CM

## 2024-02-21 DIAGNOSIS — E78.2 MIXED HYPERLIPIDEMIA: ICD-10-CM

## 2024-02-21 PROCEDURE — 3074F SYST BP LT 130 MM HG: CPT | Mod: CPTII,95,, | Performed by: NURSE PRACTITIONER

## 2024-02-21 PROCEDURE — 3051F HG A1C>EQUAL 7.0%<8.0%: CPT | Mod: CPTII,95,, | Performed by: NURSE PRACTITIONER

## 2024-02-21 PROCEDURE — 99214 OFFICE O/P EST MOD 30 MIN: CPT | Mod: 95,,, | Performed by: NURSE PRACTITIONER

## 2024-02-21 PROCEDURE — 1159F MED LIST DOCD IN RCRD: CPT | Mod: CPTII,95,, | Performed by: NURSE PRACTITIONER

## 2024-02-21 PROCEDURE — 3079F DIAST BP 80-89 MM HG: CPT | Mod: CPTII,95,, | Performed by: NURSE PRACTITIONER

## 2024-02-21 PROCEDURE — 1160F RVW MEDS BY RX/DR IN RCRD: CPT | Mod: CPTII,95,, | Performed by: NURSE PRACTITIONER

## 2024-02-21 RX ORDER — LISINOPRIL 20 MG/1
TABLET ORAL
COMMUNITY

## 2024-02-21 RX ORDER — ROSUVASTATIN CALCIUM 20 MG/1
20 TABLET, COATED ORAL NIGHTLY
Qty: 90 TABLET | Refills: 2 | Status: SHIPPED | OUTPATIENT
Start: 2024-02-21 | End: 2024-11-17

## 2024-02-21 RX ORDER — INSULIN LISPRO 100 [IU]/ML
10 INJECTION, SOLUTION INTRAVENOUS; SUBCUTANEOUS
Qty: 18 ML | Refills: 6 | Status: SHIPPED | OUTPATIENT
Start: 2024-02-21 | End: 2025-04-16

## 2024-02-21 RX ORDER — METOCLOPRAMIDE 10 MG/1
10 TABLET ORAL EVERY 6 HOURS
COMMUNITY
Start: 2023-08-28

## 2024-02-21 RX ORDER — METFORMIN HYDROCHLORIDE 1000 MG/1
1000 TABLET ORAL 2 TIMES DAILY WITH MEALS
Qty: 180 TABLET | Refills: 2 | Status: SHIPPED | OUTPATIENT
Start: 2024-02-21 | End: 2024-11-17

## 2024-02-21 RX ORDER — SILDENAFIL CITRATE 100 MG/1
100 TABLET, FILM COATED ORAL DAILY
Qty: 30 TABLET | Refills: 5 | Status: SHIPPED | OUTPATIENT
Start: 2024-02-21 | End: 2024-08-19

## 2024-02-21 RX ORDER — PEN NEEDLE, DIABETIC 30 GX3/16"
NEEDLE, DISPOSABLE MISCELLANEOUS
Qty: 100 EACH | Refills: 6 | Status: SHIPPED | OUTPATIENT
Start: 2024-02-21

## 2024-02-21 NOTE — ASSESSMENT & PLAN NOTE
A1C At Goal  Continue RX metformin 1000 mg BID, Januvia 50 mg daily, lantus 40 units q hs  Continue Humalog 10 units TID with meals  F/U with  as directed  Follow ADA diet.  Avoid soda, simple sweets, and limit rice/pasta/bread/starches and consume brown options when possible.   Maintain healthy weight with BMI goal <30.   Perform aerobic exercise for 150 minutes per week (or 5 days a week for 30 minutes each day).   Examine feet daily.     Lab Results   Component Value Date    HGBA1C 7.3 (H) 01/22/2024

## 2024-02-21 NOTE — ASSESSMENT & PLAN NOTE
FLP At Goal   Continue RX rosuvastatin 20 mg q hs  Follow a low cholesterol, low saturated fat diet with less than 200 mg of cholesterol a day.   Avoid fried foods and high saturated fats (nicole, sausage, cookies, cakes, chips, cheese, whole milk, butter, mayonnaise, creamy dressings, gravy, stew, gumbo, boudin, cracklins and cream sauces).  Add flax seed or fish oil supplements to diet.   Increase dietary fiber.   Regular exercise improves cholesterol levels.  Physical activity 5 times a week for 30 minutes per day (or 150 minutes per week).   Stressed importance of dietary modifications.    Lab Results   Component Value Date    CHOL 136 01/22/2024     Lab Results   Component Value Date    HDL 35 01/22/2024     Lab Results   Component Value Date    TRIG 87 01/22/2024     Lab Results   Component Value Date    VLDL 17 01/22/2024     Lab Results   Component Value Date    LDL 84.00 01/22/2024

## 2024-02-21 NOTE — ASSESSMENT & PLAN NOTE
Lab Results   Component Value Date    [id number] 28.8 (L) 01/22/2024     Educated on increasing foods high in Vitamin D such as fish oil, cod liver oil, salmon, milk fortified with vitamin D.   Vitamin D 2000 I.U. tablets daily (purchase over the counter).  Repeat Vitamin D level as ordered.

## 2024-02-21 NOTE — PROGRESS NOTES
Tonja Tavares, SHRADDHA   [facility]  [address] [facility] [address]      PATIENT NAME: Rodrigo Laguerre  : 1990  DATE: 24  MRN: [id number]      Reason for Visit / Chief Complaint: Health Maintenance (Lab review )       History of Present Illness / Problem Focused Workflow     Rodrigo Laguerre presents to the clinic with Health Maintenance (Lab review )     32 yo male here today T2DM f/u via audio virtual. T2DM, HTN, and HLD.     Osteoporosis Screening - 22 Vitamin D level 56.3  Diabetic Screening - 22 Fundal Exam . 22 Foot Exam  STI Screening -   Eye Screening - [facility]  Dental Screening - Dr. Zay Rodriguez        2023  Pt here today for f/u, missed his 3 month f/u in January; LOV his Januvia was increased to 50 mg daily. The pt reports today he is seeing Dr. Bolden now who is managing his Diabetes at this time. A1C decreased from 8.9 to 7.5. Pt reports he stopped taking his Lisinopril due to cough, has been monitoring his BP at home and it is good, today BP is WNL. Will complete Fundal & Foot exams today. Will f/u in 8 months for routine f/u for wellness.      24  Pt here today via virtual for routine f/u; labs completed and reviewed with no questions or concerns at this time. A1C still controlled. Discussed with pt importance of routine f/u appts with me, verbalized understanding. Meds reviewed and refilled appropriately. Will f/u with the pt in about 3 months for routine wellness with labs F2F. Denies any acute issues today. Will add iron studies and b12 to pt labs for next time due to abnormal CBC.          Review of Systems     Review of Systems      Medications and Allergies     Medications  Medication List with Changes/Refills   Current Medications    LANTUS SOLOSTAR U-100 INSULIN GLARGINE 100 UNITS/ML SUBQ PEN    Inject 40 Units into the skin every evening. For Diabetes.    LISINOPRIL (PRINIVIL,ZESTRIL) 20  "MG TABLET    Take 1 tablet every day by oral route as directed for 30 days.    METOCLOPRAMIDE HCL (REGLAN) 10 MG TABLET    Take 10 mg by mouth every 6 (six) hours.    NAPROXEN (NAPROSYN) 500 MG TABLET    Take 1 tablet (500 mg total) by mouth 2 (two) times daily.   Changed and/or Refilled Medications    Modified Medication Previous Medication    HUMALOG KWIKPEN INSULIN 100 UNIT/ML PEN HUMALOG KWIKPEN INSULIN 100 unit/mL pen       Inject 10 Units into the skin 3 (three) times daily before meals.    Inject 10 Units into the skin 3 (three) times daily before meals.    METFORMIN (GLUCOPHAGE) 1000 MG TABLET metFORMIN (GLUCOPHAGE) 1000 MG tablet       Take 1 tablet (1,000 mg total) by mouth 2 (two) times daily with meals. For Diabetes    Take 1 tablet (1,000 mg total) by mouth 2 (two) times daily with meals. For Diabetes    PEN NEEDLE, DIABETIC 32 GAUGE X 5/32" NDLE pen needle, diabetic 32 gauge x 5/32" Ndle       For 4 x daily Glucose checks. ICD 10 E11.9    For 4 x daily Glucose checks. ICD 10 E11.9    ROSUVASTATIN (CRESTOR) 20 MG TABLET rosuvastatin (CRESTOR) 20 MG tablet       Take 1 tablet (20 mg total) by mouth every evening. For High Cholesterol    Take 1 tablet (20 mg total) by mouth every evening. For High Cholesterol    SITAGLIPTIN PHOSPHATE (JANUVIA) 50 MG TAB SITagliptin phosphate (JANUVIA) 50 MG Tab       Take 1 tablet (50 mg total) by mouth once daily. For Diabetes.    Take 1 tablet (50 mg total) by mouth once daily. For Diabetes.    VIAGRA 100 MG TABLET VIAGRA 100 mg tablet       Take 1 tablet (100 mg total) by mouth once daily.    Take 1 tablet (100 mg total) by mouth once daily.         Allergies  Review of patient's allergies indicates:  No Known Allergies    Physical Examination     Vitals:    02/21/24 1226   BP: 126/83     Physical Exam      Results     Lab Results   Component Value Date    WBC 8.39 01/22/2024    RBC 5.38 01/22/2024    HGB 11.9 (L) 01/22/2024    HCT 37.8 (L) 01/22/2024    MCV 70.3 (L) " 01/22/2024    MCH 22.1 (L) 01/22/2024    MCHC 31.5 (L) 01/22/2024    RDW 15.7 01/22/2024     01/22/2024    MPV 11.3 (H) 01/22/2024     Sodium Level   Date Value Ref Range Status   01/22/2024 140 136 - 145 mmol/L Final     Potassium Level   Date Value Ref Range Status   01/22/2024 4.3 3.5 - 5.1 mmol/L Final     Carbon Dioxide   Date Value Ref Range Status   01/22/2024 27 22 - 29 mmol/L Final     Blood Urea Nitrogen   Date Value Ref Range Status   01/22/2024 9.4 8.9 - 20.6 mg/dL Final     Creatinine   Date Value Ref Range Status   01/22/2024 0.85 0.73 - 1.18 mg/dL Final     Calcium Level Total   Date Value Ref Range Status   01/22/2024 9.4 8.4 - 10.2 mg/dL Final     Albumin Level   Date Value Ref Range Status   01/22/2024 4.2 3.5 - 5.0 g/dL Final     Bilirubin Total   Date Value Ref Range Status   01/22/2024 0.4 <=1.5 mg/dL Final     Alkaline Phosphatase   Date Value Ref Range Status   01/22/2024 46 40 - 150 unit/L Final     Aspartate Aminotransferase   Date Value Ref Range Status   01/22/2024 24 5 - 34 unit/L Final     Alanine Aminotransferase   Date Value Ref Range Status   01/22/2024 33 0 - 55 unit/L Final     Estimated GFR-Non    Date Value Ref Range Status   10/19/2019 >105 >>=90 mL/min Final     Lab Results   Component Value Date    CHOL 136 01/22/2024     Lab Results   Component Value Date    HDL 35 01/22/2024     Lab Results   Component Value Date    TRIG 87 01/22/2024     Lab Results   Component Value Date    VLDL 17 01/22/2024     Lab Results   Component Value Date    LDL 84.00 01/22/2024     Lab Results   Component Value Date    TSH 1.993 01/22/2024     Lab Results   Component Value Date    PROTEINUA Trace (A) 01/22/2024    LEUKOCYTESUR 250 (A) 01/22/2024     Lab Results   Component Value Date    HGBA1C 7.3 (H) 01/22/2024    HGBA1C 7.5 (H) 04/18/2023    HGBA1C 8.9 (H) 11/08/2022           Assessment         ICD-10-CM ICD-9-CM   1. Type 2 diabetes mellitus without complication, with  long-term current use of insulin  E11.9 250.00    Z79.4 V58.67   2. Vitamin D deficiency  E55.9 268.9   3. Mixed hyperlipidemia  E78.2 272.2   4. Erectile dysfunction, unspecified erectile dysfunction type  N52.9 607.84   5. Anemia, unspecified type  D64.9 285.9       Plan      Problem List Items Addressed This Visit          Cardiac/Vascular    Mixed hyperlipidemia    Current Assessment & Plan     FLP At Goal   Continue RX rosuvastatin 20 mg q hs  Follow a low cholesterol, low saturated fat diet with less than 200 mg of cholesterol a day.   Avoid fried foods and high saturated fats (nicole, sausage, cookies, cakes, chips, cheese, whole milk, butter, mayonnaise, creamy dressings, gravy, stew, gumbo, boudin, cracklins and cream sauces).  Add flax seed or fish oil supplements to diet.   Increase dietary fiber.   Regular exercise improves cholesterol levels.  Physical activity 5 times a week for 30 minutes per day (or 150 minutes per week).   Stressed importance of dietary modifications.    Lab Results   Component Value Date    CHOL 136 01/22/2024     Lab Results   Component Value Date    HDL 35 01/22/2024     Lab Results   Component Value Date    TRIG 87 01/22/2024     Lab Results   Component Value Date    VLDL 17 01/22/2024     Lab Results   Component Value Date    LDL 84.00 01/22/2024              Relevant Medications    rosuvastatin (CRESTOR) 20 MG tablet       Endocrine    Type 2 diabetes mellitus without complication, with long-term current use of insulin - Primary (Chronic)    Current Assessment & Plan     A1C At Goal  Continue RX metformin 1000 mg BID, Januvia 50 mg daily, lantus 40 units q hs  Continue Humalog 10 units TID with meals  F/U with  as directed  Follow ADA diet.  Avoid soda, simple sweets, and limit rice/pasta/bread/starches and consume brown options when possible.   Maintain healthy weight with BMI goal <30.   Perform aerobic exercise for 150 minutes per week (or 5 days a week for 30  "minutes each day).   Examine feet daily.     Lab Results   Component Value Date    HGBA1C 7.3 (H) 01/22/2024            Relevant Medications    HUMALOG KWIKPEN INSULIN 100 unit/mL pen    metFORMIN (GLUCOPHAGE) 1000 MG tablet    pen needle, diabetic 32 gauge x 5/32" Ndle    SITagliptin phosphate (JANUVIA) 50 MG Tab    Other Relevant Orders    Urinalysis, Reflex to Urine Culture    Microalbumin/Creatinine Ratio, Urine    Hemoglobin A1C    Basic Metabolic Panel    Vitamin D deficiency    Current Assessment & Plan     Lab Results   Component Value Date    [id number] 28.8 (L) 01/22/2024   Educated on increasing foods high in Vitamin D such as fish oil, cod liver oil, salmon, milk fortified with vitamin D.   Vitamin D 2000 I.U. tablets daily (purchase over the counter).  Repeat Vitamin D level as ordered.            Other Visit Diagnoses       Erectile dysfunction, unspecified erectile dysfunction type        Relevant Medications    VIAGRA 100 mg tablet    Anemia, unspecified type        Relevant Orders    Vitamin B12    Iron and TIBC    Ferritin            Future Appointments   Date Time Provider Department Center   5/29/2024  7:40 AM Handy, Tonja N, FNP [facility]   8/15/2024 10:00 AM Handy, Tonja N, FNP [facility] [place] [facility]            Signature:     [facility]  [address] [facility] [place] [address]    Date of encounter: 2/21/24    The patient location is: home  The chief complaint leading to consultation is: lab review    Visit type: audiovisual    Face to Face time with patient: 20  25 minutes of total time spent on the encounter, which includes face to face time and non-face to face time preparing to see the patient (eg, review of tests), Obtaining and/or reviewing separately obtained history, Documenting clinical information in the electronic or other health record, Independently interpreting results (not separately reported) and " communicating results to the patient/family/caregiver, or Care coordination (not separately reported).         Each patient to whom he or she provides medical services by telemedicine is:  (1) informed of the relationship between the physician and patient and the respective role of any other health care provider with respect to management of the patient; and (2) notified that he or she may decline to receive medical services by telemedicine and may withdraw from such care at any time.    Notes:

## 2024-06-07 DIAGNOSIS — E11.9 TYPE 2 DIABETES MELLITUS WITHOUT COMPLICATION, WITH LONG-TERM CURRENT USE OF INSULIN: ICD-10-CM

## 2024-06-07 DIAGNOSIS — Z79.4 TYPE 2 DIABETES MELLITUS WITHOUT COMPLICATION, WITH LONG-TERM CURRENT USE OF INSULIN: ICD-10-CM

## 2024-06-10 RX ORDER — INSULIN GLARGINE 100 [IU]/ML
40 INJECTION, SOLUTION SUBCUTANEOUS NIGHTLY
Qty: 3 ML | Refills: 1 | Status: SHIPPED | OUTPATIENT
Start: 2024-06-10 | End: 2024-12-07

## 2024-07-21 DIAGNOSIS — N52.9 ERECTILE DYSFUNCTION, UNSPECIFIED ERECTILE DYSFUNCTION TYPE: ICD-10-CM

## 2024-07-22 RX ORDER — SILDENAFIL CITRATE 100 MG/1
100 TABLET, FILM COATED ORAL DAILY
Qty: 30 TABLET | Refills: 5 | Status: SHIPPED | OUTPATIENT
Start: 2024-07-22 | End: 2025-01-18

## 2024-08-14 ENCOUNTER — PATIENT MESSAGE (OUTPATIENT)
Dept: INTERNAL MEDICINE | Facility: CLINIC | Age: 34
End: 2024-08-14
Payer: COMMERCIAL

## 2024-08-14 DIAGNOSIS — Z79.4 TYPE 2 DIABETES MELLITUS WITHOUT COMPLICATION, WITH LONG-TERM CURRENT USE OF INSULIN: ICD-10-CM

## 2024-08-14 DIAGNOSIS — E11.9 TYPE 2 DIABETES MELLITUS WITHOUT COMPLICATION, WITH LONG-TERM CURRENT USE OF INSULIN: ICD-10-CM

## 2024-08-14 RX ORDER — INSULIN GLARGINE 100 [IU]/ML
40 INJECTION, SOLUTION SUBCUTANEOUS NIGHTLY
Qty: 3 ML | Refills: 0 | Status: SHIPPED | OUTPATIENT
Start: 2024-08-14 | End: 2025-02-10

## 2024-09-13 DIAGNOSIS — E11.9 TYPE 2 DIABETES MELLITUS WITHOUT COMPLICATION, WITH LONG-TERM CURRENT USE OF INSULIN: Primary | ICD-10-CM

## 2024-09-13 DIAGNOSIS — Z79.4 TYPE 2 DIABETES MELLITUS WITHOUT COMPLICATION, WITH LONG-TERM CURRENT USE OF INSULIN: Primary | ICD-10-CM

## 2024-09-16 ENCOUNTER — LAB VISIT (OUTPATIENT)
Dept: LAB | Facility: HOSPITAL | Age: 34
End: 2024-09-16
Attending: NURSE PRACTITIONER
Payer: COMMERCIAL

## 2024-09-16 DIAGNOSIS — Z79.4 TYPE 2 DIABETES MELLITUS WITHOUT COMPLICATION, WITH LONG-TERM CURRENT USE OF INSULIN: ICD-10-CM

## 2024-09-16 DIAGNOSIS — E11.9 TYPE 2 DIABETES MELLITUS WITHOUT COMPLICATION, WITH LONG-TERM CURRENT USE OF INSULIN: ICD-10-CM

## 2024-09-16 LAB
BACTERIA #/AREA URNS AUTO: ABNORMAL /HPF
BILIRUB UR QL STRIP.AUTO: NEGATIVE
CLARITY UR: CLEAR
COLOR UR AUTO: YELLOW
CREAT UR-MCNC: 159.7 MG/DL (ref 63–166)
GLUCOSE UR QL STRIP: NORMAL
HGB UR QL STRIP: NEGATIVE
HYALINE CASTS #/AREA URNS LPF: ABNORMAL /LPF
KETONES UR QL STRIP: NEGATIVE
LEUKOCYTE ESTERASE UR QL STRIP: 250
MICROALBUMIN UR-MCNC: 171.7 UG/ML
MICROALBUMIN/CREAT RATIO PNL UR: 107.5 MG/GM CR (ref 0–30)
MUCOUS THREADS URNS QL MICRO: ABNORMAL /LPF
NITRITE UR QL STRIP: NEGATIVE
PH UR STRIP: 6 [PH]
PROT UR QL STRIP: ABNORMAL
RBC #/AREA URNS AUTO: ABNORMAL /HPF
SP GR UR STRIP.AUTO: 1.02 (ref 1–1.03)
SQUAMOUS #/AREA URNS LPF: ABNORMAL /HPF
UROBILINOGEN UR STRIP-ACNC: NORMAL
WBC #/AREA URNS AUTO: ABNORMAL /HPF

## 2024-09-16 PROCEDURE — 82043 UR ALBUMIN QUANTITATIVE: CPT

## 2024-09-16 PROCEDURE — 87086 URINE CULTURE/COLONY COUNT: CPT

## 2024-09-16 PROCEDURE — 81015 MICROSCOPIC EXAM OF URINE: CPT

## 2024-09-16 PROCEDURE — 82570 ASSAY OF URINE CREATININE: CPT

## 2024-09-16 PROCEDURE — 81001 URINALYSIS AUTO W/SCOPE: CPT

## 2024-09-17 ENCOUNTER — OFFICE VISIT (OUTPATIENT)
Dept: INTERNAL MEDICINE | Facility: CLINIC | Age: 34
End: 2024-09-17
Payer: COMMERCIAL

## 2024-09-17 VITALS
TEMPERATURE: 98 F | BODY MASS INDEX: 34.14 KG/M2 | DIASTOLIC BLOOD PRESSURE: 89 MMHG | SYSTOLIC BLOOD PRESSURE: 132 MMHG | HEIGHT: 74 IN | WEIGHT: 266 LBS | RESPIRATION RATE: 16 BRPM | HEART RATE: 89 BPM

## 2024-09-17 DIAGNOSIS — E11.9 TYPE 2 DIABETES MELLITUS WITHOUT COMPLICATION, WITH LONG-TERM CURRENT USE OF INSULIN: ICD-10-CM

## 2024-09-17 DIAGNOSIS — Z00.00 WELLNESS EXAMINATION: ICD-10-CM

## 2024-09-17 DIAGNOSIS — Z23 IMMUNIZATION DUE: Primary | ICD-10-CM

## 2024-09-17 DIAGNOSIS — Z79.4 TYPE 2 DIABETES MELLITUS WITHOUT COMPLICATION, WITH LONG-TERM CURRENT USE OF INSULIN: ICD-10-CM

## 2024-09-17 PROCEDURE — 3008F BODY MASS INDEX DOCD: CPT | Mod: CPTII,,, | Performed by: NURSE PRACTITIONER

## 2024-09-17 PROCEDURE — 3066F NEPHROPATHY DOC TX: CPT | Mod: CPTII,,, | Performed by: NURSE PRACTITIONER

## 2024-09-17 PROCEDURE — 3075F SYST BP GE 130 - 139MM HG: CPT | Mod: CPTII,,, | Performed by: NURSE PRACTITIONER

## 2024-09-17 PROCEDURE — 3052F HG A1C>EQUAL 8.0%<EQUAL 9.0%: CPT | Mod: CPTII,,, | Performed by: NURSE PRACTITIONER

## 2024-09-17 PROCEDURE — 4010F ACE/ARB THERAPY RXD/TAKEN: CPT | Mod: CPTII,,, | Performed by: NURSE PRACTITIONER

## 2024-09-17 PROCEDURE — 90677 PCV20 VACCINE IM: CPT | Mod: PBBFAC

## 2024-09-17 PROCEDURE — 1159F MED LIST DOCD IN RCRD: CPT | Mod: CPTII,,, | Performed by: NURSE PRACTITIONER

## 2024-09-17 PROCEDURE — 3060F POS MICROALBUMINURIA REV: CPT | Mod: CPTII,,, | Performed by: NURSE PRACTITIONER

## 2024-09-17 PROCEDURE — 3079F DIAST BP 80-89 MM HG: CPT | Mod: CPTII,,, | Performed by: NURSE PRACTITIONER

## 2024-09-17 PROCEDURE — 99214 OFFICE O/P EST MOD 30 MIN: CPT | Mod: 25,S$PBB,, | Performed by: NURSE PRACTITIONER

## 2024-09-17 PROCEDURE — 1160F RVW MEDS BY RX/DR IN RCRD: CPT | Mod: CPTII,,, | Performed by: NURSE PRACTITIONER

## 2024-09-17 PROCEDURE — 90471 IMMUNIZATION ADMIN: CPT | Mod: PBBFAC

## 2024-09-17 PROCEDURE — 99214 OFFICE O/P EST MOD 30 MIN: CPT | Mod: PBBFAC,25 | Performed by: NURSE PRACTITIONER

## 2024-09-17 PROCEDURE — 99395 PREV VISIT EST AGE 18-39: CPT | Mod: S$PBB,,, | Performed by: NURSE PRACTITIONER

## 2024-09-17 RX ORDER — PEN NEEDLE, DIABETIC 29 G X1/2"
NEEDLE, DISPOSABLE MISCELLANEOUS
COMMUNITY
Start: 2024-03-26

## 2024-09-17 RX ORDER — BLOOD-GLUCOSE SENSOR
EACH MISCELLANEOUS
Qty: 3 EACH | Refills: 12 | Status: SHIPPED | OUTPATIENT
Start: 2024-09-17

## 2024-09-17 RX ADMIN — PNEUMOCOCCAL 20-VALENT CONJUGATE VACCINE 0.5 ML
2.2; 2.2; 2.2; 2.2; 2.2; 2.2; 2.2; 2.2; 2.2; 2.2; 2.2; 2.2; 2.2; 2.2; 2.2; 2.2; 4.4; 2.2; 2.2; 2.2 INJECTION, SUSPENSION INTRAMUSCULAR at 11:09

## 2024-09-17 NOTE — ASSESSMENT & PLAN NOTE
A1C Above goarl   Continue RX metformin 1000 mg BID, Increase Januvia 100 mg daily, lantus 40 units q hs  Continue Humalog 10 units TID with meals  F/U with  as directed  Follow ADA diet.  Avoid soda, simple sweets, and limit rice/pasta/bread/starches and consume brown options when possible.   Maintain healthy weight with BMI goal <30.   Perform aerobic exercise for 150 minutes per week (or 5 days a week for 30 minutes each day).   Examine feet daily.     Lab Results   Component Value Date    HGBA1C 8.0 (H) 09/16/2024

## 2024-09-17 NOTE — PROGRESS NOTES
Tonja Tavares, SHRADDHA   [facility]  [address] [address]      PATIENT NAME: Rodrigo Laguerre  : 1990  DATE: 24  MRN: [id number]      Reason for Visit / Chief Complaint: Diabetes (Taking DM medication as prescribed... lab review )       History of Present Illness / Problem Focused Workflow     Rodrigo Laguerre presents to the clinic with Diabetes (Taking DM medication as prescribed... lab review )     33 yo male here today for f/u. Medical problems include T2DM, HTN, and HLD.     Osteoporosis Screening - 22 Vitamin D level 56.3  Diabetic Screening - 22 Fundal Exam . 22 Foot Exam  STI Screening -   Eye Screening - [facility]  Dental Screening - Dr. Zay Rodriguez         2023  Pt here today for f/u, missed his 3 month f/u in January; LOV his Januvia was increased to 50 mg daily. The pt reports today he is seeing Dr. Bolden now who is managing his Diabetes at this time. A1C decreased from 8.9 to 7.5. Pt reports he stopped taking his Lisinopril due to cough, has been monitoring his BP at home and it is good, today BP is WNL. Will complete Fundal & Foot exams today. Will f/u in 8 months for routine f/u for wellness.       24  Pt here today via virtual for routine f/u; labs completed and reviewed with no questions or concerns at this time. A1C still controlled. Discussed with pt importance of routine f/u appts with me, verbalized understanding. Meds reviewed and refilled appropriately. Will f/u with the pt in about 3 months for routine wellness with labs F2F. Denies any acute issues today. Will add iron studies and b12 to pt labs for next time due to abnormal CBC.    2024  Pt her today for Wellness and f/u appointment after multiple missed visits. Labs completed and reviewed. A1C increased since last eval and his above goal at this time at 8.0. 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 He is agreeable today to increase dosage of Jardiance to 100 mg daily. Will also send orders for DEXCOM G7 and referral for Diabetes Education for teaching. Denies any acute issues or concerns today. Will f/u in about 2 months via virtual with labs for T2DM eval. Pt reports he has appt today with external eye clinic.                 Review of Systems     Review of Systems   Constitutional: Negative.    HENT: Negative.     Eyes: Negative.    Respiratory: Negative.     Cardiovascular: Negative.    Gastrointestinal: Negative.    Endocrine: Negative.    Genitourinary: Negative.    Neurological: Negative.    Psychiatric/Behavioral: Negative.           Medications and Allergies     Medications  Medication List with Changes/Refills   New Medications    BLOOD-GLUCOSE SENSOR (DEXCOM G7 SENSOR) LUANNE    Apply 1 sensor every 10 days.   Current Medications    BD INSULIN SYRINGE ULTRA-FINE 1 ML 31 GAUGE X 5/16 SYRG    USE DAILY TO ADMINISTER LANTUS    HUMALOG KWIKPEN INSULIN 100 UNIT/ML PEN    Inject 10 Units into the skin 3 (three) times daily before meals.    LANTUS SOLOSTAR U-100 INSULIN 100 UNIT/ML (3 ML) INPN PEN    Inject 40 Units into the skin every evening. For Diabetes.    LISINOPRIL (PRINIVIL,ZESTRIL) 20 MG TABLET    Take 1 tablet every day by oral route as directed for 30 days.    METFORMIN (GLUCOPHAGE) 1000 MG TABLET    Take 1 tablet (1,000 mg total) by mouth 2 (two) times daily with meals. For Diabetes    METOCLOPRAMIDE HCL (REGLAN) 10 MG TABLET    Take 10 mg by mouth every 6 (six) hours.    PEN NEEDLE, DIABETIC 32 GAUGE X 5/32" NDLE    For 4 x daily Glucose checks. ICD 10 E11.9    ROSUVASTATIN (CRESTOR) 20 MG TABLET    Take 1 tablet (20 mg total) by mouth every evening. For High Cholesterol    VIAGRA 100 MG TABLET    Take 1 tablet (100 mg total) by mouth once daily.   Changed and/or Refilled Medications    Modified " Medication Previous Medication    SITAGLIPTIN PHOSPHATE (JANUVIA) 100 MG TAB SITagliptin phosphate (JANUVIA) 50 MG Tab       Take 1 tablet (100 mg total) by mouth once daily. For Diabetes.    Take 1 tablet (50 mg total) by mouth once daily. For Diabetes.   Discontinued Medications    NAPROXEN (NAPROSYN) 500 MG TABLET    Take 1 tablet (500 mg total) by mouth 2 (two) times daily.         Allergies  Review of patient's allergies indicates:  No Known Allergies    Physical Examination     Vitals:    09/17/24 1105   BP: 132/89   Pulse: 89   Resp: 16   Temp: 98 °F (36.7 °C)     Physical Exam  Vitals reviewed.   Constitutional:       Appearance: Normal appearance. He is normal weight.   HENT:      Head: Normocephalic.   Cardiovascular:      Rate and Rhythm: Normal rate and regular rhythm.      Pulses: Normal pulses.      Heart sounds: Normal heart sounds.   Pulmonary:      Effort: Pulmonary effort is normal.      Breath sounds: Normal breath sounds.   Abdominal:      General: Abdomen is flat.      Palpations: Abdomen is soft.   Musculoskeletal:         General: Normal range of motion.      Cervical back: Normal range of motion.   Skin:     General: Skin is warm and dry.   Neurological:      Mental Status: He is alert.   Psychiatric:         Mood and Affect: Mood normal.           Results     Lab Results   Component Value Date    WBC 8.39 01/22/2024    RBC 5.38 01/22/2024    HGB 11.9 (L) 01/22/2024    HCT 37.8 (L) 01/22/2024    MCV 70.3 (L) 01/22/2024    MCH 22.1 (L) 01/22/2024    MCHC 31.5 (L) 01/22/2024    RDW 15.7 01/22/2024     01/22/2024    MPV 11.3 (H) 01/22/2024     Sodium   Date Value Ref Range Status   09/16/2024 142 136 - 145 mmol/L Final     Potassium   Date Value Ref Range Status   09/16/2024 4.3 3.5 - 5.1 mmol/L Final     Chloride   Date Value Ref Range Status   09/16/2024 109 (H) 98 - 107 mmol/L Final     CO2   Date Value Ref Range Status   09/16/2024 28 22 - 29 mmol/L Final     Blood Urea Nitrogen    Date Value Ref Range Status   09/16/2024 9.3 8.9 - 20.6 mg/dL Final     Creatinine   Date Value Ref Range Status   09/16/2024 0.94 0.73 - 1.18 mg/dL Final     Calcium   Date Value Ref Range Status   09/16/2024 9.8 8.4 - 10.2 mg/dL Final     Albumin   Date Value Ref Range Status   01/22/2024 4.2 3.5 - 5.0 g/dL Final     Bilirubin Total   Date Value Ref Range Status   01/22/2024 0.4 <=1.5 mg/dL Final     ALP   Date Value Ref Range Status   01/22/2024 46 40 - 150 unit/L Final     AST   Date Value Ref Range Status   01/22/2024 24 5 - 34 unit/L Final     ALT   Date Value Ref Range Status   01/22/2024 33 0 - 55 unit/L Final     Estimated GFR-Non    Date Value Ref Range Status   10/19/2019 >105 >>=90 mL/min Final     Lab Results   Component Value Date    CHOL 136 01/22/2024     Lab Results   Component Value Date    HDL 35 01/22/2024     Lab Results   Component Value Date    TRIG 87 01/22/2024     Lab Results   Component Value Date    VLDL 17 01/22/2024     Lab Results   Component Value Date    LDL 84.00 01/22/2024     Lab Results   Component Value Date    TSH 1.993 01/22/2024     Lab Results   Component Value Date    PHUR 6.0 09/16/2024    PROTEINUA Trace (A) 09/16/2024    GLUCUA Normal 09/16/2024    OCCULTUA Negative 09/16/2024    NITRITE Negative 09/16/2024    LEUKOCYTESUR 250 (A) 09/16/2024     Lab Results   Component Value Date    HGBA1C 8.0 (H) 09/16/2024    HGBA1C 7.3 (H) 01/22/2024    HGBA1C 7.5 (H) 04/18/2023           Assessment         ICD-10-CM ICD-9-CM   1. Immunization due  Z23 V05.9   2. Wellness examination  Z00.00 V70.0   3. Type 2 diabetes mellitus without complication, with long-term current use of insulin  E11.9 250.00    Z79.4 V58.67       Plan      Problem List Items Addressed This Visit          Endocrine    Type 2 diabetes mellitus without complication, with long-term current use of insulin (Chronic)    Current Assessment & Plan     A1C Above goarl   Continue RX metformin 1000 mg  BID, Increase Januvia 100 mg daily, lantus 40 units q hs  Continue Humalog 10 units TID with meals  F/U with  as directed  Follow ADA diet.  Avoid soda, simple sweets, and limit rice/pasta/bread/starches and consume brown options when possible.   Maintain healthy weight with BMI goal <30.   Perform aerobic exercise for 150 minutes per week (or 5 days a week for 30 minutes each day).   Examine feet daily.     Lab Results   Component Value Date    HGBA1C 8.0 (H) 09/16/2024              Relevant Medications    blood-glucose sensor (DEXCOM G7 SENSOR) Priti    SITagliptin phosphate (JANUVIA) 100 MG Tab    Other Relevant Orders    Ambulatory referral/consult to Diabetes Education    Urinalysis, Reflex to Urine Culture    Basic Metabolic Panel    Hemoglobin A1C       Other    Wellness examination    Current Assessment & Plan     Pt wellness visit completed today with appropriate lab work.   HM Topics Reviewed / Updated  Immunizations Discussed  Dicussed Healthy Diet &   Encouraged to exercise 3 x weekly  Increase Water Intake  Eat more fruits and vegetables  Avoid soda & alcohol            Other Visit Diagnoses       Immunization due    -  Primary    Relevant Medications    pneumoc 20-jocelynn conj-dip cr(PF) (PREVNAR-20 (PF)) injection Syrg 0.5 mL (Completed)            No future appointments.         Signature:     [facility]  [address] [address]    Date of encounter: 9/17/24

## 2024-09-18 LAB — BACTERIA UR CULT: NO GROWTH

## 2024-09-30 DIAGNOSIS — Z79.4 TYPE 2 DIABETES MELLITUS WITHOUT COMPLICATION, WITH LONG-TERM CURRENT USE OF INSULIN: ICD-10-CM

## 2024-09-30 DIAGNOSIS — E11.9 TYPE 2 DIABETES MELLITUS WITHOUT COMPLICATION, WITH LONG-TERM CURRENT USE OF INSULIN: ICD-10-CM

## 2024-10-01 RX ORDER — INSULIN GLARGINE 100 [IU]/ML
40 INJECTION, SOLUTION SUBCUTANEOUS NIGHTLY
Qty: 3 ML | Refills: 0 | Status: SHIPPED | OUTPATIENT
Start: 2024-10-01 | End: 2025-03-30

## 2024-10-01 RX ORDER — INSULIN LISPRO 100 [IU]/ML
10 INJECTION, SOLUTION INTRAVENOUS; SUBCUTANEOUS
Qty: 18 ML | Refills: 6 | Status: SHIPPED | OUTPATIENT
Start: 2024-10-01 | End: 2025-11-25

## 2024-10-01 NOTE — TELEPHONE ENCOUNTER
Pt requesting refill for his LANTUS SOLOSTAR U-100 INSULIN 100 unit/mL (3 mL) InPn pen       LOV: 9/17/24    NOV: None -(Attempted to contact pt, no answer. LVM to return call to [facility].)        Spoke to Domitila with [facility] . Stated pt has refills for his HUMALOG KWIKPEN INSULIN 100 unit/mL pen at pharmacy .

## 2024-10-04 ENCOUNTER — TELEPHONE (OUTPATIENT)
Dept: INTERNAL MEDICINE | Facility: CLINIC | Age: 34
End: 2024-10-04
Payer: COMMERCIAL

## 2024-10-23 ENCOUNTER — TELEPHONE (OUTPATIENT)
Dept: INTERNAL MEDICINE | Facility: CLINIC | Age: 34
End: 2024-10-23
Payer: COMMERCIAL

## 2024-11-14 DIAGNOSIS — Z79.4 TYPE 2 DIABETES MELLITUS WITHOUT COMPLICATION, WITH LONG-TERM CURRENT USE OF INSULIN: ICD-10-CM

## 2024-11-14 DIAGNOSIS — E78.2 MIXED HYPERLIPIDEMIA: ICD-10-CM

## 2024-11-14 DIAGNOSIS — E11.9 TYPE 2 DIABETES MELLITUS WITHOUT COMPLICATION, WITH LONG-TERM CURRENT USE OF INSULIN: ICD-10-CM

## 2024-11-14 RX ORDER — INSULIN GLARGINE 100 [IU]/ML
40 INJECTION, SOLUTION SUBCUTANEOUS NIGHTLY
Qty: 3 ML | Refills: 0 | Status: SHIPPED | OUTPATIENT
Start: 2024-11-14 | End: 2025-05-13

## 2024-11-14 RX ORDER — METFORMIN HYDROCHLORIDE 1000 MG/1
1000 TABLET ORAL 2 TIMES DAILY WITH MEALS
Qty: 180 TABLET | Refills: 0 | Status: SHIPPED | OUTPATIENT
Start: 2024-11-14 | End: 2025-02-12

## 2024-11-14 RX ORDER — ROSUVASTATIN CALCIUM 20 MG/1
20 TABLET, COATED ORAL NIGHTLY
Qty: 90 TABLET | Refills: 0 | Status: SHIPPED | OUTPATIENT
Start: 2024-11-14 | End: 2025-02-12

## 2024-11-14 NOTE — TELEPHONE ENCOUNTER
Pt needs a soon virtual follow up and labs to be done prior.  Thanks,    Tonja Tavares,GAMALIELP

## 2024-11-14 NOTE — TELEPHONE ENCOUNTER
LOV:9/17/24    NOV: noone; LVM to return call to [facility] to schedule appt.

## 2024-12-23 DIAGNOSIS — E11.9 TYPE 2 DIABETES MELLITUS WITHOUT COMPLICATION, WITH LONG-TERM CURRENT USE OF INSULIN: ICD-10-CM

## 2024-12-23 DIAGNOSIS — Z79.4 TYPE 2 DIABETES MELLITUS WITHOUT COMPLICATION, WITH LONG-TERM CURRENT USE OF INSULIN: ICD-10-CM

## 2024-12-30 RX ORDER — INSULIN GLARGINE 100 [IU]/ML
INJECTION, SOLUTION SUBCUTANEOUS
Qty: 15 ML | Refills: 0 | Status: SHIPPED | OUTPATIENT
Start: 2024-12-30

## 2025-01-07 ENCOUNTER — LAB VISIT (OUTPATIENT)
Dept: LAB | Facility: HOSPITAL | Age: 35
End: 2025-01-07
Attending: NURSE PRACTITIONER
Payer: COMMERCIAL

## 2025-01-07 DIAGNOSIS — Z79.4 TYPE 2 DIABETES MELLITUS WITHOUT COMPLICATION, WITH LONG-TERM CURRENT USE OF INSULIN: ICD-10-CM

## 2025-01-07 DIAGNOSIS — E11.9 TYPE 2 DIABETES MELLITUS WITHOUT COMPLICATION, WITH LONG-TERM CURRENT USE OF INSULIN: ICD-10-CM

## 2025-01-07 LAB
ANION GAP SERPL CALC-SCNC: 5 MEQ/L
BACTERIA #/AREA URNS AUTO: ABNORMAL /HPF
BILIRUB UR QL STRIP.AUTO: NEGATIVE
BUN SERPL-MCNC: 8.9 MG/DL (ref 8.9–20.6)
CALCIUM SERPL-MCNC: 9.5 MG/DL (ref 8.4–10.2)
CHLORIDE SERPL-SCNC: 107 MMOL/L (ref 98–107)
CLARITY UR: CLEAR
CO2 SERPL-SCNC: 29 MMOL/L (ref 22–29)
COLOR UR AUTO: YELLOW
CREAT SERPL-MCNC: 0.77 MG/DL (ref 0.72–1.25)
CREAT/UREA NIT SERPL: 12
EST. AVERAGE GLUCOSE BLD GHB EST-MCNC: 165.7 MG/DL
GFR SERPLBLD CREATININE-BSD FMLA CKD-EPI: >60 ML/MIN/1.73/M2
GLUCOSE SERPL-MCNC: 71 MG/DL (ref 74–100)
GLUCOSE UR QL STRIP: NORMAL
HBA1C MFR BLD: 7.4 %
HGB UR QL STRIP: NEGATIVE
HYALINE CASTS #/AREA URNS LPF: ABNORMAL /LPF
KETONES UR QL STRIP: NEGATIVE
LEUKOCYTE ESTERASE UR QL STRIP: NEGATIVE
MUCOUS THREADS URNS QL MICRO: ABNORMAL /LPF
NITRITE UR QL STRIP: NEGATIVE
PH UR STRIP: 7.5 [PH]
POTASSIUM SERPL-SCNC: 4.2 MMOL/L (ref 3.5–5.1)
PROT UR QL STRIP: NEGATIVE
RBC #/AREA URNS AUTO: ABNORMAL /HPF
SODIUM SERPL-SCNC: 141 MMOL/L (ref 136–145)
SP GR UR STRIP.AUTO: 1.02 (ref 1–1.03)
SQUAMOUS #/AREA URNS LPF: ABNORMAL /HPF
UROBILINOGEN UR STRIP-ACNC: NORMAL
WBC #/AREA URNS AUTO: ABNORMAL /HPF

## 2025-01-07 PROCEDURE — 81001 URINALYSIS AUTO W/SCOPE: CPT

## 2025-01-07 PROCEDURE — 36415 COLL VENOUS BLD VENIPUNCTURE: CPT

## 2025-01-07 PROCEDURE — 83036 HEMOGLOBIN GLYCOSYLATED A1C: CPT

## 2025-01-07 PROCEDURE — 80048 BASIC METABOLIC PNL TOTAL CA: CPT

## 2025-01-07 NOTE — PROGRESS NOTES
Labs reviewed. Upcoming appointment noted, will review with patient at that time.   SHRADDHA Trinidad

## 2025-01-10 ENCOUNTER — TELEPHONE (OUTPATIENT)
Dept: INTERNAL MEDICINE | Facility: CLINIC | Age: 35
End: 2025-01-10
Payer: COMMERCIAL

## 2025-01-10 NOTE — TELEPHONE ENCOUNTER
----- Message from Rajni sent at 1/10/2025  9:58 AM CST -----  Who Called: Rodrigo Laguerre    Refill or New Rx:Refill  RX Name and Strength:LANTUS SOLOSTAR U-100 INSULIN 100 unit/mL (3 mL) InPn pen  How is the patient currently taking it? (ex. 1XDay): INJECT 40 UNITS UNDER THE SKIN EVERY EVENING FOR DIABETES  Is this a 30 day or 90 day RX:n/a  Local or Mail Order:local   List of preferred pharmacies on file (remove unneeded): [address]      Ordering Provider:Tonja Tavares FNP      Preferred Method of Contact: Phone Call  Patient's Preferred Phone Number on File: [phone]   Best Call Back Number, if different:  Additional Information: Pt stated that he has been off medication for 2 weeks.

## 2025-01-10 NOTE — TELEPHONE ENCOUNTER
I contacted patient.   I informed his medication was sent to pharmacy on 12/30/2024.   Patient voiced understanding.

## 2025-03-14 DIAGNOSIS — Z79.4 TYPE 2 DIABETES MELLITUS WITHOUT COMPLICATION, WITH LONG-TERM CURRENT USE OF INSULIN: ICD-10-CM

## 2025-03-14 DIAGNOSIS — E11.9 TYPE 2 DIABETES MELLITUS WITHOUT COMPLICATION, WITH LONG-TERM CURRENT USE OF INSULIN: ICD-10-CM

## 2025-03-17 ENCOUNTER — TELEPHONE (OUTPATIENT)
Dept: INTERNAL MEDICINE | Facility: CLINIC | Age: 35
End: 2025-03-17
Payer: COMMERCIAL

## 2025-03-17 RX ORDER — INSULIN GLARGINE 100 [IU]/ML
INJECTION, SOLUTION SUBCUTANEOUS
Qty: 15 ML | Refills: 0 | Status: SHIPPED | OUTPATIENT
Start: 2025-03-17

## 2025-03-17 NOTE — TELEPHONE ENCOUNTER
----- Message from Rajni sent at 3/17/2025  2:57 PM CDT -----  Who Called: Rodrigo Lares is requesting a sooner appointment. Caller declined first available appointment listed below. Caller will not accept being placed on the waitlist and is requesting a message be sent to doctor.When is the first available appointment?JulyOptions offered (Virtual Visit, Urgent Care): n/aSymptoms:missed appt for T2DM F/UPreferred Method of Contact: Phone CallPatient's Preferred Phone Number on File: [phone] Best Call Back Number, if different:Additional Information:

## 2025-03-17 NOTE — TELEPHONE ENCOUNTER
I will not be able to send any further refills unless patient has a visit.   Thanks,    SHRADDHA Trinidad

## 2025-04-15 DIAGNOSIS — Z79.4 TYPE 2 DIABETES MELLITUS WITHOUT COMPLICATION, WITH LONG-TERM CURRENT USE OF INSULIN: ICD-10-CM

## 2025-04-15 DIAGNOSIS — E11.9 TYPE 2 DIABETES MELLITUS WITHOUT COMPLICATION, WITH LONG-TERM CURRENT USE OF INSULIN: ICD-10-CM

## 2025-04-15 RX ORDER — INSULIN LISPRO 100 [IU]/ML
10 INJECTION, SOLUTION INTRAVENOUS; SUBCUTANEOUS
Qty: 18 ML | Refills: 6 | OUTPATIENT
Start: 2025-04-15 | End: 2026-06-09

## 2025-04-15 RX ORDER — INSULIN GLARGINE 100 [IU]/ML
INJECTION, SOLUTION SUBCUTANEOUS
Qty: 15 ML | Refills: 0 | OUTPATIENT
Start: 2025-04-15

## 2025-04-15 RX ORDER — METFORMIN HYDROCHLORIDE 1000 MG/1
1000 TABLET ORAL 2 TIMES DAILY WITH MEALS
Qty: 180 TABLET | Refills: 0 | OUTPATIENT
Start: 2025-04-15 | End: 2025-07-14

## 2025-04-15 NOTE — TELEPHONE ENCOUNTER
----- Message from Maikol sent at 4/15/2025  1:37 PM CDT -----  .Who Called: Rodrigo Lares is requesting assistance/information from provider's office.Symptoms (please be specific): na How long has patient had these symptoms:  naList of preferred pharmacies on file (remove unneeded): [unfilled]If different, enter pharmacy into here including location and phone number: naPreferred Method of Contact: Phone CallPatient's Preferred Phone Number on File: [phone] Best Call Back Number, if different:Additional Information: pt is out of diabetic medication. Patient is scheduled 7/31/25

## 2025-04-17 ENCOUNTER — OFFICE VISIT (OUTPATIENT)
Dept: INTERNAL MEDICINE | Facility: CLINIC | Age: 35
End: 2025-04-17
Payer: COMMERCIAL

## 2025-04-17 DIAGNOSIS — Z79.4 TYPE 2 DIABETES MELLITUS WITHOUT COMPLICATION, WITH LONG-TERM CURRENT USE OF INSULIN: ICD-10-CM

## 2025-04-17 DIAGNOSIS — E78.2 MIXED HYPERLIPIDEMIA: ICD-10-CM

## 2025-04-17 DIAGNOSIS — Z79.4 TYPE 2 DIABETES MELLITUS WITHOUT COMPLICATION, WITH LONG-TERM CURRENT USE OF INSULIN: Primary | ICD-10-CM

## 2025-04-17 DIAGNOSIS — E11.9 TYPE 2 DIABETES MELLITUS WITHOUT COMPLICATION, WITH LONG-TERM CURRENT USE OF INSULIN: Primary | ICD-10-CM

## 2025-04-17 DIAGNOSIS — E11.9 TYPE 2 DIABETES MELLITUS WITHOUT COMPLICATION, WITH LONG-TERM CURRENT USE OF INSULIN: ICD-10-CM

## 2025-04-17 PROCEDURE — 1160F RVW MEDS BY RX/DR IN RCRD: CPT | Mod: CPTII,95,, | Performed by: NURSE PRACTITIONER

## 2025-04-17 PROCEDURE — 3051F HG A1C>EQUAL 7.0%<8.0%: CPT | Mod: CPTII,95,, | Performed by: NURSE PRACTITIONER

## 2025-04-17 PROCEDURE — 1159F MED LIST DOCD IN RCRD: CPT | Mod: CPTII,95,, | Performed by: NURSE PRACTITIONER

## 2025-04-17 PROCEDURE — 98006 SYNCH AUDIO-VIDEO EST MOD 30: CPT | Mod: 95,,, | Performed by: NURSE PRACTITIONER

## 2025-04-17 RX ORDER — INSULIN GLARGINE 100 [IU]/ML
40 INJECTION, SOLUTION SUBCUTANEOUS NIGHTLY
Qty: 15 ML | Refills: 1 | Status: SHIPPED | OUTPATIENT
Start: 2025-04-17 | End: 2026-01-12

## 2025-04-17 RX ORDER — INSULIN LISPRO 100 [IU]/ML
10 INJECTION, SOLUTION INTRAVENOUS; SUBCUTANEOUS
Qty: 18 ML | Refills: 6 | Status: SHIPPED | OUTPATIENT
Start: 2025-04-17 | End: 2026-06-11

## 2025-04-17 RX ORDER — METFORMIN HYDROCHLORIDE 1000 MG/1
TABLET ORAL
Qty: 180 TABLET | Refills: 0 | OUTPATIENT
Start: 2025-04-17

## 2025-04-17 RX ORDER — METFORMIN HYDROCHLORIDE 1000 MG/1
1000 TABLET ORAL 2 TIMES DAILY WITH MEALS
Qty: 180 TABLET | Refills: 0 | Status: SHIPPED | OUTPATIENT
Start: 2025-04-17 | End: 2025-07-16

## 2025-04-17 RX ORDER — METFORMIN HYDROCHLORIDE 1000 MG/1
1000 TABLET ORAL 2 TIMES DAILY WITH MEALS
Qty: 180 TABLET | Refills: 0 | OUTPATIENT
Start: 2025-04-17 | End: 2025-07-16

## 2025-04-17 RX ORDER — ROSUVASTATIN CALCIUM 20 MG/1
20 TABLET, COATED ORAL NIGHTLY
Qty: 90 TABLET | Refills: 0 | OUTPATIENT
Start: 2025-04-17 | End: 2025-07-16

## 2025-04-17 NOTE — TELEPHONE ENCOUNTER
----- Message from Nicci sent at 4/17/2025  8:52 AM CDT -----  .Who Called: Rodrigo LaguerreRefill or New Rx:RefillRX Name and Strength:SITagliptin phosphate (JANUVIA) 100 MG TabHow is the patient currently taking it? (ex. 1XDay):1x per dayIs this a 30 day or 90 day RX:90 Local or Mail Order:local List of preferred pharmacies on file (remove unneeded): [facility] pharmacy If different Pharmacy is requested, enter Pharmacy information here including location and phone number: Ordering Provider:handyPreferred Method of Contact: Phone CallPatient's Preferred Phone Number on File: [phone] Best Call Back Number, if different:Additional Information: SITagliptin phosphate (JANUVIA) 100 MG Tab.Who Called: Rodrigo LaguerreRefill or New Rx:RefillRX Name and Strength:metFORMIN (GLUCOPHAGE) 1000 MG tabletHow is the patient currently taking it? (ex. 1XDay):2x per day Is this a 30 day or 90 day RX:90Local or Mail Order:local List of preferred pharmacies on file (remove unneeded): [facility] pharmacy If different Pharmacy is requested, enter Pharmacy information here including location and phone number: Ordering Provider:HandyPreferred Method of Contact: Phone CallPatient's Preferred Phone Number on File: [phone] Best Call Back Number, if different:Additional Information: metFORMIN (GLUCOPHAGE) 1000 MG tablet.Who Called: Rodrigo LaguerreRefill or New Rx:RefillRX Name and Strength:rosuvastatin (CRESTOR) 20 MG tabletHow is the patient currently taking it? (ex. 1XDay):1x per dayIs this a 30 day or 90 day RX:90Local or Mail Order:localList of preferred pharmacies on file (remove unneeded): [facility] pharmacyIf different Pharmacy is requested, enter Pharmacy information here including location and phone number: Ordering Provider:HandyPreferred Method of Contact: Phone CallPatient's Preferred Phone Number on File: [phone] Best Call Back Number, if different:Additional Information: rosuvastatin (CRESTOR) 20 MG tablet

## 2025-04-17 NOTE — PROGRESS NOTES
Tonja Tavares, SHRADDHA   [facility]  [address] [facility] [address]      PATIENT NAME: Rodrigo Laguerre  : 1990  DATE: 25  MRN: [id number]      Reason for Visit / Chief Complaint: No chief complaint on file.       History of Present Illness / Problem Focused Workflow     Rodrigo Laguerre presents to the clinic with No chief complaint on file.     35 yo male here today for f/u. Medical problems include T2DM, HTN, and HLD.     Osteoporosis Screening - 22 Vitamin D level 56.3  Diabetic Screening - 22 Fundal Exam . 22 Foot Exam  STI Screening -   Eye Screening - [facility]  Dental Screening - Dr. Zay Rodriguez         2024  Pt her today for Wellness and f/u appointment after multiple missed visits. Labs completed and reviewed. A1C increased since last eval and his above goal at this time at 8.0. 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 He is agreeable today to increase dosage of Jardiance to 100 mg daily. Will also send orders for DEXCOM G7 and referral for Diabetes Education for teaching. Denies any acute issues or concerns today. Will f/u in about 2 months via virtual with labs for T2DM eval. Pt reports he has appt today with external eye clinic.     2025  History of Present Illness  Patient presents today for diabetes follow up He takes Januvia 100mg, Metformin 1000mg twice daily, Lantus 40 units at night, and Humalog 10 units 3 times daily. He is unable to use Dexcom continuous glucose monitor due to work restrictions at [employer] facility where inmates may have access to the device on his arm, will switch to his abdomen and try again. A1C was 7.4 in January. He underwent Achilles tendon repair surgery last month on the . He is scheduled for suture and boot removal on May 11th with plans to  "begin physical therapy thereafter. Will fu in July as scheduled with labs and prn.                 Review of Systems     Review of Systems   Constitutional: Negative.    HENT: Negative.     Eyes: Negative.    Respiratory: Negative.     Cardiovascular: Negative.    Gastrointestinal: Negative.    Endocrine: Negative.    Genitourinary: Negative.    Neurological: Negative.    Psychiatric/Behavioral: Negative.           Medications and Allergies     Medications  Medication List with Changes/Refills   Current Medications    BD INSULIN SYRINGE ULTRA-FINE 1 ML 31 GAUGE X 5/16 SYRG    USE DAILY TO ADMINISTER LANTUS    BLOOD-GLUCOSE SENSOR (DEXCOM G7 SENSOR) LUANNE    Apply 1 sensor every 10 days.    LISINOPRIL (PRINIVIL,ZESTRIL) 20 MG TABLET    Take 1 tablet every day by oral route as directed for 30 days.    PEN NEEDLE, DIABETIC 32 GAUGE X 5/32" NDLE    For 4 x daily Glucose checks. ICD 10 E11.9    ROSUVASTATIN (CRESTOR) 20 MG TABLET    Take 1 tablet (20 mg total) by mouth every evening. For High Cholesterol   Changed and/or Refilled Medications    Modified Medication Previous Medication    HUMALOG KWIKPEN INSULIN 100 UNIT/ML PEN HUMALOG KWIKPEN INSULIN 100 unit/mL pen       Inject 10 Units into the skin 3 (three) times daily before meals.    Inject 10 Units into the skin 3 (three) times daily before meals.    LANTUS SOLOSTAR U-100 INSULIN 100 UNIT/ML (3 ML) INPN PEN LANTUS SOLOSTAR U-100 INSULIN 100 unit/mL (3 mL) InPn pen       Inject 40 Units into the skin every evening.    INJECT 40 UNITS UNDER THE SKIN EVERY EVENING FOR DIABETES    METFORMIN (GLUCOPHAGE) 1000 MG TABLET metFORMIN (GLUCOPHAGE) 1000 MG tablet       Take 1 tablet (1,000 mg total) by mouth 2 (two) times daily with meals. For Diabetes    Take 1 tablet (1,000 mg total) by mouth 2 (two) times daily with meals. For Diabetes    SITAGLIPTIN PHOSPHATE (JANUVIA) 100 MG TAB SITagliptin phosphate (JANUVIA) 100 MG Tab       Take 1 tablet (100 mg total) by mouth once " daily. For Diabetes.    Take 1 tablet (100 mg total) by mouth once daily. For Diabetes.   Discontinued Medications    METOCLOPRAMIDE HCL (REGLAN) 10 MG TABLET    Take 10 mg by mouth every 6 (six) hours.    VIAGRA 100 MG TABLET    Take 1 tablet (100 mg total) by mouth once daily.         Allergies  Review of patient's allergies indicates:  No Known Allergies    Physical Examination   There were no vitals filed for this visit.  Physical Exam  HENT:      Right Ear: Hearing normal.      Left Ear: Hearing normal.   Neurological:      Mental Status: He is alert and oriented to person, place, and time.   Psychiatric:         Mood and Affect: Mood normal.           Results     Lab Results   Component Value Date    WBC 8.39 01/22/2024    RBC 5.38 01/22/2024    HGB 11.9 (L) 01/22/2024    HCT 37.8 (L) 01/22/2024    MCV 70.3 (L) 01/22/2024    MCH 22.1 (L) 01/22/2024    MCHC 31.5 (L) 01/22/2024    RDW 15.7 01/22/2024     01/22/2024    MPV 11.3 (H) 01/22/2024     Sodium   Date Value Ref Range Status   01/07/2025 141 136 - 145 mmol/L Final     Potassium   Date Value Ref Range Status   01/07/2025 4.2 3.5 - 5.1 mmol/L Final     Chloride   Date Value Ref Range Status   01/07/2025 107 98 - 107 mmol/L Final     CO2   Date Value Ref Range Status   01/07/2025 29 22 - 29 mmol/L Final     Blood Urea Nitrogen   Date Value Ref Range Status   01/07/2025 8.9 8.9 - 20.6 mg/dL Final     Creatinine   Date Value Ref Range Status   01/07/2025 0.77 0.72 - 1.25 mg/dL Final     Calcium   Date Value Ref Range Status   01/07/2025 9.5 8.4 - 10.2 mg/dL Final     Albumin   Date Value Ref Range Status   01/22/2024 4.2 3.5 - 5.0 g/dL Final     Bilirubin Total   Date Value Ref Range Status   01/22/2024 0.4 <=1.5 mg/dL Final     ALP   Date Value Ref Range Status   01/22/2024 46 40 - 150 unit/L Final     AST   Date Value Ref Range Status   01/22/2024 24 5 - 34 unit/L Final     ALT   Date Value Ref Range Status   01/22/2024 33 0 - 55 unit/L Final      Estimated GFR-Non    Date Value Ref Range Status   10/19/2019 >105 >>=90 mL/min Final     Lab Results   Component Value Date    CHOL 136 01/22/2024     Lab Results   Component Value Date    HDL 35 01/22/2024     Lab Results   Component Value Date    TRIG 87 01/22/2024     Lab Results   Component Value Date    VLDL 17 01/22/2024     Lab Results   Component Value Date    LDL 84.00 01/22/2024     Lab Results   Component Value Date    TSH 1.993 01/22/2024     Lab Results   Component Value Date    PHUR 7.5 01/07/2025    PROTEINUA Negative 01/07/2025    GLUCUA Normal 01/07/2025    OCCULTUA Negative 01/07/2025    NITRITE Negative 01/07/2025    LEUKOCYTESUR Negative 01/07/2025     Lab Results   Component Value Date    HGBA1C 7.4 (H) 01/07/2025    HGBA1C 8.0 (H) 09/16/2024    HGBA1C 7.3 (H) 01/22/2024           Assessment         ICD-10-CM ICD-9-CM   1. Type 2 diabetes mellitus without complication, with long-term current use of insulin  E11.9 250.00    Z79.4 V58.67       Plan      1. Type 2 diabetes mellitus without complication, with long-term current use of insulin  Assessment & Plan:  A1C near goal   Continue RX metformin 1000 mg BID, Increase Januvia 100 mg daily, lantus 40 units q hs  Continue Humalog 10 units TID with meals  Restart Dexcom G7  Follow ADA diet.  Avoid soda, simple sweets, and limit rice/pasta/bread/starches and consume brown options when possible.   Maintain healthy weight with BMI goal <30.   Perform aerobic exercise for 150 minutes per week (or 5 days a week for 30 minutes each day).   Examine feet daily.     Lab Results   Component Value Date    HGBA1C 7.4 (H) 01/07/2025         Orders:  -     HUMALOG KWIKPEN INSULIN 100 unit/mL pen; Inject 10 Units into the skin 3 (three) times daily before meals.  Dispense: 18 mL; Refill: 6  -     LANTUS SOLOSTAR U-100 INSULIN 100 unit/mL (3 mL) InPn pen; Inject 40 Units into the skin every evening.  Dispense: 15 mL; Refill: 1  -     metFORMIN  (GLUCOPHAGE) 1000 MG tablet; Take 1 tablet (1,000 mg total) by mouth 2 (two) times daily with meals. For Diabetes  Dispense: 180 tablet; Refill: 0  -     SITagliptin phosphate (JANUVIA) 100 MG Tab; Take 1 tablet (100 mg total) by mouth once daily. For Diabetes.  Dispense: 90 tablet; Refill: 2  -     Urinalysis, Reflex to Urine Culture; Future; Expected date: 06/17/2025  -     Microalbumin/Creatinine Ratio, Urine; Future; Expected date: 06/17/2025  -     Lipid Panel; Future; Expected date: 06/17/2025  -     Hemoglobin A1C; Future; Expected date: 06/17/2025  -     Comprehensive Metabolic Panel; Future; Expected date: 06/17/2025  -     CBC Auto Differential; Future; Expected date: 06/17/2025         Future Appointments   Date Time Provider Department Center   7/31/2025  7:00 AM Tonja Tavares FNP [facility]            Signature:     [facility]  [address] [facility] [address]    Date of encounter: 4/17/25    This note was generated with the assistance of ambient listening technology. Verbal consent was obtained by the patient and accompanying visitor(s) for the recording of patient appointment to facilitate this note. I attest to having reviewed and edited the generated note for accuracy, though some syntax or spelling errors may persist. Please contact the author of this note for any clarification.      The patient location is: home  The chief complaint leading to consultation is: T2DM     Visit type: audiovisual    Face to Face time with patient: 15  20 minutes of total time spent on the encounter, which includes face to face time and non-face to face time preparing to see the patient (eg, review of tests), Obtaining and/or reviewing separately obtained history, Documenting clinical information in the electronic or other health record, Independently interpreting results (not separately reported) and communicating results to the  patient/family/caregiver, or Care coordination (not separately reported).         Each patient to whom he or she provides medical services by telemedicine is:  (1) informed of the relationship between the physician and patient and the respective role of any other health care provider with respect to management of the patient; and (2) notified that he or she may decline to receive medical services by telemedicine and may withdraw from such care at any time.    Notes:

## 2025-04-17 NOTE — ASSESSMENT & PLAN NOTE
A1C near goal   Continue RX metformin 1000 mg BID, Increase Januvia 100 mg daily, lantus 40 units q hs  Continue Humalog 10 units TID with meals  Restart Dexcom G7  Follow ADA diet.  Avoid soda, simple sweets, and limit rice/pasta/bread/starches and consume brown options when possible.   Maintain healthy weight with BMI goal <30.   Perform aerobic exercise for 150 minutes per week (or 5 days a week for 30 minutes each day).   Examine feet daily.     Lab Results   Component Value Date    HGBA1C 7.4 (H) 01/07/2025

## 2025-04-28 DIAGNOSIS — E78.2 MIXED HYPERLIPIDEMIA: ICD-10-CM

## 2025-04-29 RX ORDER — NABUMETONE 500 MG/1
1000 TABLET, FILM COATED ORAL 2 TIMES DAILY
COMMUNITY
Start: 2025-03-09

## 2025-04-29 RX ORDER — TRAMADOL HYDROCHLORIDE 50 MG/1
50 TABLET ORAL EVERY 6 HOURS PRN
COMMUNITY
Start: 2025-03-14

## 2025-04-29 RX ORDER — TIZANIDINE 4 MG/1
4 TABLET ORAL EVERY 8 HOURS PRN
COMMUNITY
Start: 2024-12-26

## 2025-04-29 RX ORDER — PROMETHAZINE HYDROCHLORIDE 25 MG/1
25 TABLET ORAL EVERY 8 HOURS PRN
COMMUNITY
Start: 2025-03-25

## 2025-04-29 RX ORDER — IBUPROFEN 600 MG/1
600 TABLET ORAL EVERY 6 HOURS PRN
COMMUNITY
Start: 2024-12-26

## 2025-04-29 RX ORDER — ALBUTEROL SULFATE 90 UG/1
2 INHALANT RESPIRATORY (INHALATION) EVERY 4 HOURS PRN
COMMUNITY
Start: 2024-12-17

## 2025-04-29 RX ORDER — METHYLPREDNISOLONE 4 MG/1
TABLET ORAL
COMMUNITY
Start: 2024-12-17

## 2025-04-29 RX ORDER — HYDROCODONE BITARTRATE AND ACETAMINOPHEN 7.5; 325 MG/1; MG/1
1 TABLET ORAL 4 TIMES DAILY PRN
COMMUNITY
Start: 2025-04-24

## 2025-04-29 RX ORDER — METHOCARBAMOL 500 MG/1
1000 TABLET, FILM COATED ORAL EVERY 6 HOURS
COMMUNITY
Start: 2025-03-08

## 2025-04-29 RX ORDER — ROSUVASTATIN CALCIUM 20 MG/1
TABLET, COATED ORAL
Qty: 90 TABLET | Refills: 2 | Status: SHIPPED | OUTPATIENT
Start: 2025-04-29

## 2025-07-17 DIAGNOSIS — Z79.4 TYPE 2 DIABETES MELLITUS WITHOUT COMPLICATION, WITH LONG-TERM CURRENT USE OF INSULIN: ICD-10-CM

## 2025-07-17 DIAGNOSIS — E11.9 TYPE 2 DIABETES MELLITUS WITHOUT COMPLICATION, WITH LONG-TERM CURRENT USE OF INSULIN: ICD-10-CM

## 2025-07-17 RX ORDER — METFORMIN HYDROCHLORIDE 1000 MG/1
1000 TABLET ORAL 2 TIMES DAILY WITH MEALS
Qty: 180 TABLET | Refills: 0 | Status: SHIPPED | OUTPATIENT
Start: 2025-07-17 | End: 2025-10-15

## 2025-07-21 DIAGNOSIS — Z79.4 TYPE 2 DIABETES MELLITUS WITHOUT COMPLICATION, WITH LONG-TERM CURRENT USE OF INSULIN: ICD-10-CM

## 2025-07-21 DIAGNOSIS — E11.9 TYPE 2 DIABETES MELLITUS WITHOUT COMPLICATION, WITH LONG-TERM CURRENT USE OF INSULIN: ICD-10-CM

## 2025-07-21 RX ORDER — INSULIN GLARGINE 100 [IU]/ML
40 INJECTION, SOLUTION SUBCUTANEOUS NIGHTLY
Qty: 15 ML | Refills: 1 | Status: SHIPPED | OUTPATIENT
Start: 2025-07-21 | End: 2026-04-17

## 2025-07-21 NOTE — TELEPHONE ENCOUNTER
Copied from CRM #[id number]. Topic: Medications - Medication Refill  >> Jul 21, 2025 10:29 AM Gary wrote:  Who Called: Rodrigo Laguerre    Refill or New Rx:Refill    RX Name and Strength:LANTUS SOLOSTAR U-100 INSULIN 100 unit/mL (3 mL) InPn pen  How is the patient currently taking it? (ex. 1XDay):  Is this a 30 day or 90 day RX:  Local or Mail Order:  List of preferred pharmacies on file (remove unneeded): [unfilled]  If different Pharmacy is requested, enter Pharmacy information here including location and phone number: [address]       Ordering Provider  Patient's Preferred Phone Number on File: [phone]   Best Call Back Number, if different:  Additional Information:

## 2025-07-28 ENCOUNTER — LAB VISIT (OUTPATIENT)
Dept: LAB | Facility: HOSPITAL | Age: 35
End: 2025-07-28
Attending: NURSE PRACTITIONER
Payer: COMMERCIAL

## 2025-07-28 DIAGNOSIS — E11.9 TYPE 2 DIABETES MELLITUS WITHOUT COMPLICATION, WITH LONG-TERM CURRENT USE OF INSULIN: ICD-10-CM

## 2025-07-28 DIAGNOSIS — Z79.4 TYPE 2 DIABETES MELLITUS WITHOUT COMPLICATION, WITH LONG-TERM CURRENT USE OF INSULIN: ICD-10-CM

## 2025-07-28 LAB
ALBUMIN SERPL-MCNC: 3.9 G/DL (ref 3.5–5)
ALBUMIN/CREAT UR: 108.7 MG/GM CR (ref 0–30)
ALBUMIN/GLOB SERPL: 0.9 RATIO (ref 1.1–2)
ALP SERPL-CCNC: 53 UNIT/L (ref 40–150)
ALT SERPL-CCNC: 40 UNIT/L (ref 0–55)
ANION GAP SERPL CALC-SCNC: 9 MEQ/L
AST SERPL-CCNC: 24 UNIT/L (ref 11–45)
BACTERIA #/AREA URNS AUTO: ABNORMAL /HPF
BASOPHILS # BLD AUTO: 0.02 X10(3)/MCL
BASOPHILS NFR BLD AUTO: 0.3 %
BILIRUB SERPL-MCNC: 0.2 MG/DL
BILIRUB UR QL STRIP.AUTO: NEGATIVE
BUN SERPL-MCNC: 9.3 MG/DL (ref 8.9–20.6)
CALCIUM SERPL-MCNC: 9.1 MG/DL (ref 8.4–10.2)
CHLORIDE SERPL-SCNC: 107 MMOL/L (ref 98–107)
CHOLEST SERPL-MCNC: 140 MG/DL
CHOLEST/HDLC SERPL: 4 {RATIO} (ref 0–5)
CLARITY UR: CLEAR
CO2 SERPL-SCNC: 24 MMOL/L (ref 22–29)
COLOR UR AUTO: YELLOW
CREAT SERPL-MCNC: 1.02 MG/DL (ref 0.72–1.25)
CREAT UR-MCNC: 223.6 MG/DL (ref 63–166)
CREAT/UREA NIT SERPL: 9
EOSINOPHIL # BLD AUTO: 0.12 X10(3)/MCL (ref 0–0.9)
EOSINOPHIL NFR BLD AUTO: 1.7 %
ERYTHROCYTE [DISTWIDTH] IN BLOOD BY AUTOMATED COUNT: 15.8 % (ref 11.5–17)
EST. AVERAGE GLUCOSE BLD GHB EST-MCNC: 294.8 MG/DL
GFR SERPLBLD CREATININE-BSD FMLA CKD-EPI: >60 ML/MIN/1.73/M2
GLOBULIN SER-MCNC: 4.3 GM/DL (ref 2.4–3.5)
GLUCOSE SERPL-MCNC: 268 MG/DL (ref 74–100)
GLUCOSE UR QL STRIP: ABNORMAL
HBA1C MFR BLD: 11.9 %
HCT VFR BLD AUTO: 36.2 % (ref 42–52)
HDLC SERPL-MCNC: 35 MG/DL (ref 35–60)
HGB BLD-MCNC: 10.6 G/DL (ref 14–18)
HGB UR QL STRIP: NEGATIVE
HYALINE CASTS #/AREA URNS LPF: ABNORMAL /LPF
IMM GRANULOCYTES # BLD AUTO: 0.01 X10(3)/MCL (ref 0–0.04)
IMM GRANULOCYTES NFR BLD AUTO: 0.1 %
KETONES UR QL STRIP: NEGATIVE
LDLC SERPL CALC-MCNC: 79 MG/DL (ref 50–140)
LEUKOCYTE ESTERASE UR QL STRIP: 75
LYMPHOCYTES # BLD AUTO: 3.17 X10(3)/MCL (ref 0.6–4.6)
LYMPHOCYTES NFR BLD AUTO: 44.1 %
MCH RBC QN AUTO: 21.5 PG (ref 27–31)
MCHC RBC AUTO-ENTMCNC: 29.3 G/DL (ref 33–36)
MCV RBC AUTO: 73.4 FL (ref 80–94)
MICROALBUMIN UR-MCNC: 243 UG/ML
MONOCYTES # BLD AUTO: 0.44 X10(3)/MCL (ref 0.1–1.3)
MONOCYTES NFR BLD AUTO: 6.1 %
MUCOUS THREADS URNS QL MICRO: ABNORMAL /LPF
NEUTROPHILS # BLD AUTO: 3.43 X10(3)/MCL (ref 2.1–9.2)
NEUTROPHILS NFR BLD AUTO: 47.7 %
NITRITE UR QL STRIP: NEGATIVE
NRBC BLD AUTO-RTO: 0 %
PH UR STRIP: 5.5 [PH]
PLATELET # BLD AUTO: 276 X10(3)/MCL (ref 130–400)
PMV BLD AUTO: 11.5 FL (ref 7.4–10.4)
POTASSIUM SERPL-SCNC: 4 MMOL/L (ref 3.5–5.1)
PROT SERPL-MCNC: 8.2 GM/DL (ref 6.4–8.3)
PROT UR QL STRIP: ABNORMAL
RBC # BLD AUTO: 4.93 X10(6)/MCL (ref 4.7–6.1)
RBC #/AREA URNS AUTO: ABNORMAL /HPF
SODIUM SERPL-SCNC: 140 MMOL/L (ref 136–145)
SP GR UR STRIP.AUTO: 1.04 (ref 1–1.03)
SQUAMOUS #/AREA URNS LPF: ABNORMAL /HPF
TRIGL SERPL-MCNC: 129 MG/DL (ref 34–140)
UROBILINOGEN UR STRIP-ACNC: NORMAL
VLDLC SERPL CALC-MCNC: 26 MG/DL
WBC # BLD AUTO: 7.19 X10(3)/MCL (ref 4.5–11.5)
WBC #/AREA URNS AUTO: ABNORMAL /HPF

## 2025-07-28 PROCEDURE — 82570 ASSAY OF URINE CREATININE: CPT

## 2025-07-28 PROCEDURE — 83036 HEMOGLOBIN GLYCOSYLATED A1C: CPT

## 2025-07-28 PROCEDURE — 81015 MICROSCOPIC EXAM OF URINE: CPT

## 2025-07-28 PROCEDURE — 80061 LIPID PANEL: CPT

## 2025-07-28 PROCEDURE — 36415 COLL VENOUS BLD VENIPUNCTURE: CPT

## 2025-07-28 PROCEDURE — 85025 COMPLETE CBC W/AUTO DIFF WBC: CPT

## 2025-07-28 PROCEDURE — 80053 COMPREHEN METABOLIC PANEL: CPT

## 2025-07-31 ENCOUNTER — OFFICE VISIT (OUTPATIENT)
Dept: INTERNAL MEDICINE | Facility: CLINIC | Age: 35
End: 2025-07-31
Payer: COMMERCIAL

## 2025-07-31 ENCOUNTER — CLINICAL SUPPORT (OUTPATIENT)
Dept: INTERNAL MEDICINE | Facility: CLINIC | Age: 35
End: 2025-07-31
Attending: NURSE PRACTITIONER
Payer: COMMERCIAL

## 2025-07-31 VITALS
HEIGHT: 74 IN | OXYGEN SATURATION: 98 % | BODY MASS INDEX: 35.25 KG/M2 | WEIGHT: 274.69 LBS | RESPIRATION RATE: 18 BRPM | SYSTOLIC BLOOD PRESSURE: 160 MMHG | DIASTOLIC BLOOD PRESSURE: 98 MMHG | HEART RATE: 92 BPM | TEMPERATURE: 98 F

## 2025-07-31 DIAGNOSIS — Z79.4 TYPE 2 DIABETES MELLITUS WITHOUT COMPLICATION, WITH LONG-TERM CURRENT USE OF INSULIN: Chronic | ICD-10-CM

## 2025-07-31 DIAGNOSIS — I10 PRIMARY HYPERTENSION: ICD-10-CM

## 2025-07-31 DIAGNOSIS — E11.9 TYPE 2 DIABETES MELLITUS WITHOUT COMPLICATION, WITH LONG-TERM CURRENT USE OF INSULIN: Chronic | ICD-10-CM

## 2025-07-31 DIAGNOSIS — Z79.4 TYPE 2 DIABETES MELLITUS WITHOUT COMPLICATION, WITH LONG-TERM CURRENT USE OF INSULIN: Primary | Chronic | ICD-10-CM

## 2025-07-31 DIAGNOSIS — E11.9 TYPE 2 DIABETES MELLITUS WITHOUT COMPLICATION, WITH LONG-TERM CURRENT USE OF INSULIN: Primary | Chronic | ICD-10-CM

## 2025-07-31 PROCEDURE — 92228 IMG RTA DETC/MNTR DS PHY/QHP: CPT | Mod: TC,PBBFAC

## 2025-07-31 PROCEDURE — 1160F RVW MEDS BY RX/DR IN RCRD: CPT | Mod: CPTII,,, | Performed by: NURSE PRACTITIONER

## 2025-07-31 PROCEDURE — 99215 OFFICE O/P EST HI 40 MIN: CPT | Mod: PBBFAC | Performed by: NURSE PRACTITIONER

## 2025-07-31 PROCEDURE — 4010F ACE/ARB THERAPY RXD/TAKEN: CPT | Mod: CPTII,,, | Performed by: NURSE PRACTITIONER

## 2025-07-31 PROCEDURE — 3008F BODY MASS INDEX DOCD: CPT | Mod: CPTII,,, | Performed by: NURSE PRACTITIONER

## 2025-07-31 PROCEDURE — G2211 COMPLEX E/M VISIT ADD ON: HCPCS | Mod: ,,, | Performed by: NURSE PRACTITIONER

## 2025-07-31 PROCEDURE — 3046F HEMOGLOBIN A1C LEVEL >9.0%: CPT | Mod: CPTII,,, | Performed by: NURSE PRACTITIONER

## 2025-07-31 PROCEDURE — 3066F NEPHROPATHY DOC TX: CPT | Mod: CPTII,,, | Performed by: NURSE PRACTITIONER

## 2025-07-31 PROCEDURE — 1159F MED LIST DOCD IN RCRD: CPT | Mod: CPTII,,, | Performed by: NURSE PRACTITIONER

## 2025-07-31 PROCEDURE — 3060F POS MICROALBUMINURIA REV: CPT | Mod: CPTII,,, | Performed by: NURSE PRACTITIONER

## 2025-07-31 PROCEDURE — 99214 OFFICE O/P EST MOD 30 MIN: CPT | Mod: S$PBB,,, | Performed by: NURSE PRACTITIONER

## 2025-07-31 PROCEDURE — 3077F SYST BP >= 140 MM HG: CPT | Mod: CPTII,,, | Performed by: NURSE PRACTITIONER

## 2025-07-31 PROCEDURE — 3080F DIAST BP >= 90 MM HG: CPT | Mod: CPTII,,, | Performed by: NURSE PRACTITIONER

## 2025-07-31 RX ORDER — LISINOPRIL 20 MG/1
20 TABLET ORAL DAILY
Qty: 90 TABLET | Refills: 0 | Status: SHIPPED | OUTPATIENT
Start: 2025-07-31 | End: 2025-10-29

## 2025-07-31 RX ORDER — SEMAGLUTIDE 0.68 MG/ML
0.25 INJECTION, SOLUTION SUBCUTANEOUS
Qty: 1.5 ML | Refills: 0 | Status: SHIPPED | OUTPATIENT
Start: 2025-07-31 | End: 2025-08-30

## 2025-07-31 RX ORDER — SEMAGLUTIDE 0.68 MG/ML
0.25 INJECTION, SOLUTION SUBCUTANEOUS
Qty: 1.5 ML | Refills: 0 | Status: SHIPPED | OUTPATIENT
Start: 2025-07-31 | End: 2025-07-31

## 2025-07-31 NOTE — PROGRESS NOTES
Tonja Tavares, SHRADDHA   [facility]  [address] [facility] [address]      PATIENT NAME: Rodrigo Laguerre  : 1990  DATE: 25  MRN: [id number]      Reason for Visit / Chief Complaint: Diabetes and Follow-up       History of Present Illness / Problem Focused Workflow     Rodrigo Laguerre presents to the clinic with Diabetes and Follow-up     35 yo male here today for f/u. Medical problems include T2DM, HTN, and HLD.     Osteoporosis Screening - 22 Vitamin D level 56.3  Diabetic Screening - 22 Fundal Exam . 22 Foot Exam  STI Screening -   Eye Screening - [facility]  Dental Screening - Dr. Zay Rodriguez         2024  Pt her today for Wellness and f/u appointment after multiple missed visits. Labs completed and reviewed. A1C increased since last eval and his above goal at this time at 8.0. 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 He is agreeable today to increase dosage of Jardiance to 100 mg daily. Will also send orders for DEXCOM G7 and referral for Diabetes Education for teaching. Denies any acute issues or concerns today. Will f/u in about 2 months via virtual with labs for T2DM eval. Pt reports he has appt today with external eye clinic.     2025  History of Present Illness  Patient presents today for diabetes follow up He takes Januvia 100mg, Metformin 1000mg twice daily, Lantus 40 units at night, and Humalog 10 units 3 times daily. 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 A1C was 7.4 in January. He underwent Achilles tendon repair surgery last month on the . He is scheduled for suture and boot removal on May 11th with plans to begin physical  therapy thereafter. Will fu in July as scheduled with labs and prn.     07/31/2025  History of Present Illness  Patient presents today for follow up His A1C has increased from 7.4 to 11, indicating poor glycemic control. He currently takes Januvia, Humalog, and Lantus. He is not consistently using his Dexcom continuous glucose monitoring device. His dietary habits have deteriorated following surgery, with increased reliance on [employer], processed foods, and frozen meals due to medication-induced increased appetite. He expresses interest in medication adjustments, potential weekly injectable medications, and virtual diabetes education. He underwent surgical repair for a ruptured Achilles tendon in March. His recovery was initially progressing well but has become more challenging. During post-surgical recovery, he was prescribed hydrocodone and another medication that significantly increased his appetite. Prior to surgery, he was actively cooking his own meals. He reports elevated blood pressure with variable home readings throughout the day: normal in the morning, elevated midday, and returning to acceptable levels by evening. He checks his blood pressure 3 times daily. He was previously on Lisinopril which was discontinued when blood pressure was well-controlled, but is now experiencing recurrent hypertension. He has home blood pressure monitoring equipment. Will restart lisinopril, will f/u with BP log in a few weeks and for med eval after initiation of Ozempic 0.25 mg weekly.  Visit today included increased complexity associated with the care of the episodic problem T2DM addressed and managing the longitudinal care of the patient due to the serious and/or complex managed problem(s) T2DM.                    Review of Systems     Review of Systems   Constitutional: Negative.    HENT: Negative.     Eyes: Negative.    Respiratory: Negative.     Cardiovascular: Negative.    Gastrointestinal: Negative.    Endocrine:  "Negative.    Genitourinary: Negative.    Neurological: Negative.    Psychiatric/Behavioral: Negative.           Medications and Allergies     Medications  Medication List with Changes/Refills   New Medications    SEMAGLUTIDE (OZEMPIC) 0.25 MG OR 0.5 MG (2 MG/3 ML) PEN INJECTOR    Inject 0.25 mg into the skin every 7 days. For Diabetes   Current Medications    BD INSULIN SYRINGE ULTRA-FINE 1 ML 31 GAUGE X 5/16 SYRG    USE DAILY TO ADMINISTER LANTUS    BLOOD-GLUCOSE SENSOR (DEXCOM G7 SENSOR) LUANNE    Apply 1 sensor every 10 days.    HUMALOG KWIKPEN INSULIN 100 UNIT/ML PEN    Inject 10 Units into the skin 3 (three) times daily before meals.    LANTUS SOLOSTAR U-100 INSULIN 100 UNIT/ML (3 ML) INPN PEN    Inject 40 Units into the skin every evening.    METFORMIN (GLUCOPHAGE) 1000 MG TABLET    Take 1 tablet (1,000 mg total) by mouth 2 (two) times daily with meals. For Diabetes    PEN NEEDLE, DIABETIC 32 GAUGE X 5/32" NDLE    For 4 x daily Glucose checks. ICD 10 E11.9    ROSUVASTATIN (CRESTOR) 20 MG TABLET    TAKE 1 TABLET BY MOUTH EVERY EVENING FOR HIGH CHOLESTEROL   Changed and/or Refilled Medications    Modified Medication Previous Medication    LISINOPRIL (PRINIVIL,ZESTRIL) 20 MG TABLET lisinopriL (PRINIVIL,ZESTRIL) 20 MG tablet       Take 1 tablet (20 mg total) by mouth once daily.    Take 1 tablet every day by oral route as directed for 30 days.   Discontinued Medications    ALBUTEROL (PROVENTIL/VENTOLIN HFA) 90 MCG/ACTUATION INHALER    2 puffs every 4 (four) hours as needed.    HYDROCODONE-ACETAMINOPHEN (NORCO) 7.5-325 MG PER TABLET    Take 1 tablet by mouth 4 (four) times daily as needed.    IBUPROFEN (ADVIL,MOTRIN) 600 MG TABLET    Take 600 mg by mouth every 6 (six) hours as needed.    METHOCARBAMOL (ROBAXIN) 500 MG TAB    Take 1,000 mg by mouth every 6 (six) hours.    METHYLPREDNISOLONE (MEDROL DOSEPACK) 4 MG TABLET    Take by mouth.    NABUMETONE (RELAFEN) 500 MG TABLET    Take 1,000 mg by mouth 2 (two) times " daily.    PROMETHAZINE (PHENERGAN) 25 MG TABLET    Take 25 mg by mouth every 8 (eight) hours as needed.    SITAGLIPTIN PHOSPHATE (JANUVIA) 100 MG TAB    Take 1 tablet (100 mg total) by mouth once daily. For Diabetes.    TIZANIDINE (ZANAFLEX) 4 MG TABLET    Take 4 mg by mouth every 8 (eight) hours as needed.    TRAMADOL (ULTRAM) 50 MG TABLET    Take 50 mg by mouth every 6 (six) hours as needed.         Allergies  Review of patient's allergies indicates:  No Known Allergies    Physical Examination     Vitals:    07/31/25 0718   BP: (!) 160/98   Pulse:    Resp:    Temp:      Physical Exam  Vitals reviewed.   Constitutional:       Appearance: Normal appearance. He is obese.   HENT:      Head: Normocephalic.   Cardiovascular:      Rate and Rhythm: Normal rate and regular rhythm.      Pulses: Normal pulses.      Heart sounds: Normal heart sounds.   Pulmonary:      Effort: Pulmonary effort is normal.      Breath sounds: Normal breath sounds.   Abdominal:      General: Abdomen is flat.      Palpations: Abdomen is soft.   Musculoskeletal:         General: Normal range of motion.      Cervical back: Normal range of motion.   Skin:     General: Skin is warm and dry.   Neurological:      Mental Status: He is alert.   Psychiatric:         Mood and Affect: Mood normal.           Results     Lab Results   Component Value Date    WBC 7.19 07/28/2025    RBC 4.93 07/28/2025    HGB 10.6 (L) 07/28/2025    HCT 36.2 (L) 07/28/2025    MCV 73.4 (L) 07/28/2025    MCH 21.5 (L) 07/28/2025    MCHC 29.3 (L) 07/28/2025    RDW 15.8 07/28/2025     07/28/2025    MPV 11.5 (H) 07/28/2025     Sodium   Date Value Ref Range Status   07/28/2025 140 136 - 145 mmol/L Final     Potassium   Date Value Ref Range Status   07/28/2025 4.0 3.5 - 5.1 mmol/L Final     Chloride   Date Value Ref Range Status   07/28/2025 107 98 - 107 mmol/L Final     CO2   Date Value Ref Range Status   07/28/2025 24 22 - 29 mmol/L Final     Glucose   Date Value Ref Range  "Status   07/28/2025 268 (H) 74 - 100 mg/dL Final     Blood Urea Nitrogen   Date Value Ref Range Status   07/28/2025 9.3 8.9 - 20.6 mg/dL Final     Creatinine   Date Value Ref Range Status   07/28/2025 1.02 0.72 - 1.25 mg/dL Final     Calcium   Date Value Ref Range Status   07/28/2025 9.1 8.4 - 10.2 mg/dL Final     Protein Total   Date Value Ref Range Status   07/28/2025 8.2 6.4 - 8.3 gm/dL Final     Albumin   Date Value Ref Range Status   07/28/2025 3.9 3.5 - 5.0 g/dL Final     Bilirubin Total   Date Value Ref Range Status   07/28/2025 0.2 <=1.5 mg/dL Final     ALP   Date Value Ref Range Status   07/28/2025 53 40 - 150 unit/L Final     AST   Date Value Ref Range Status   07/28/2025 24 11 - 45 unit/L Final     ALT   Date Value Ref Range Status   07/28/2025 40 0 - 55 unit/L Final     Estimated GFR-Non    Date Value Ref Range Status   10/19/2019 >105 >>=90 mL/min Final     Lab Results   Component Value Date    CHOL 140 07/28/2025     Lab Results   Component Value Date    HDL 35 07/28/2025     Lab Results   Component Value Date    TRIG 129 07/28/2025     Lab Results   Component Value Date    VLDL 26 07/28/2025     No components found for: "LDL"  Lab Results   Component Value Date    TSH 1.993 01/22/2024     Lab Results   Component Value Date    PHUR 5.5 07/28/2025    PROTEINUA 1+ (A) 07/28/2025    GLUCUA 4+ (A) 07/28/2025    OCCULTUA Negative 07/28/2025    NITRITE Negative 07/28/2025    LEUKOCYTESUR 75 (A) 07/28/2025     Lab Results   Component Value Date    HGBA1C 11.9 (H) 07/28/2025    HGBA1C 7.4 (H) 01/07/2025    HGBA1C 8.0 (H) 09/16/2024           Assessment         ICD-10-CM ICD-9-CM   1. Type 2 diabetes mellitus without complication, with long-term current use of insulin  E11.9 250.00    Z79.4 V58.67   2. Primary hypertension  I10 401.9       Plan      1. Type 2 diabetes mellitus without complication, with long-term current use of insulin  Assessment & Plan:  A1C above goal   DC Januvia  Start RX " ozempic 0.25 mg weekly  F/U in 3 weeks via virtual  Referral to Diabetes Education   Continue RX metformin 1000 mg BID, lantus 40 units q hs  Continue Humalog 10 units TID with meals  Restart Dexcom G7  Follow ADA diet.  Avoid soda, simple sweets, and limit rice/pasta/bread/starches and consume brown options when possible.   Maintain healthy weight with BMI goal <30.   Perform aerobic exercise for 150 minutes per week (or 5 days a week for 30 minutes each day).   Examine feet daily.     Lab Results   Component Value Date    HGBA1C 11.9 (H) 07/28/2025         Orders:  -     Discontinue: semaglutide (OZEMPIC) 0.25 mg or 0.5 mg (2 mg/3 mL) pen injector; Inject 0.25 mg into the skin every 7 days. For Diabetes  Dispense: 1.5 mL; Refill: 0  -     semaglutide (OZEMPIC) 0.25 mg or 0.5 mg (2 mg/3 mL) pen injector; Inject 0.25 mg into the skin every 7 days. For Diabetes  Dispense: 1.5 mL; Refill: 0  -     Ambulatory referral/consult to Diabetes Education; Future; Expected date: 07/31/2025  -     Diabetic Eye Screening Photo; Future; Expected date: 07/31/2025    2. Primary hypertension  Assessment & Plan:  BP above goal  Restart lisinopril 20 mg daily  3 week virtual visit for BP Check   BP: (!) 160/98  Low Sodium Diet (Dash Diet - less than 2 grams of sodium per day).  Maintain healthy weight with goal BMI <30. Exercise 30 minutes per day 5 days per week.      Orders:  -     lisinopriL (PRINIVIL,ZESTRIL) 20 MG tablet; Take 1 tablet (20 mg total) by mouth once daily.  Dispense: 90 tablet; Refill: 0         Future Appointments   Date Time Provider Department Center   8/22/2025  7:00 AM Tonja Tavares FNP [facility]            Signature:     [facility]  [address] [address]    Date of encounter: 7/31/25    This note was generated with the assistance of ambient listening technology. Verbal consent was obtained by the patient and  accompanying visitor(s) for the recording of patient appointment to facilitate this note. I attest to having reviewed and edited the generated note for accuracy, though some syntax or spelling errors may persist. Please contact the author of this note for any clarification.

## 2025-07-31 NOTE — ASSESSMENT & PLAN NOTE
BP above goal  Restart lisinopril 20 mg daily  3 week virtual visit for BP Check   BP: (!) 160/98  Low Sodium Diet (Dash Diet - less than 2 grams of sodium per day).  Maintain healthy weight with goal BMI <30. Exercise 30 minutes per day 5 days per week.

## 2025-07-31 NOTE — PROGRESS NOTES
Rodrigo Laguerre is a 34 y.o. male here for a diabetic eye screening with non-dilated fundus photos per Tonja Tavares NP.    Patient cooperative?: Yes  Small pupils?: Yes  Last eye exam: unknown    For exam results, see Encounter Report.

## 2025-07-31 NOTE — ASSESSMENT & PLAN NOTE
A1C above goal   DC Januvia  Start RX ozempic 0.25 mg weekly  F/U in 3 weeks via virtual  Referral to Diabetes Education   Continue RX metformin 1000 mg BID, lantus 40 units q hs  Continue Humalog 10 units TID with meals  Restart Dexcom G7  Follow ADA diet.  Avoid soda, simple sweets, and limit rice/pasta/bread/starches and consume brown options when possible.   Maintain healthy weight with BMI goal <30.   Perform aerobic exercise for 150 minutes per week (or 5 days a week for 30 minutes each day).   Examine feet daily.     Lab Results   Component Value Date    HGBA1C 11.9 (H) 07/28/2025

## 2025-08-06 DIAGNOSIS — Z79.4 TYPE 2 DIABETES MELLITUS WITHOUT COMPLICATION, WITH LONG-TERM CURRENT USE OF INSULIN: ICD-10-CM

## 2025-08-06 DIAGNOSIS — E11.9 TYPE 2 DIABETES MELLITUS WITHOUT COMPLICATION, WITH LONG-TERM CURRENT USE OF INSULIN: ICD-10-CM
